# Patient Record
Sex: FEMALE | Race: WHITE | NOT HISPANIC OR LATINO | Employment: FULL TIME | URBAN - METROPOLITAN AREA
[De-identification: names, ages, dates, MRNs, and addresses within clinical notes are randomized per-mention and may not be internally consistent; named-entity substitution may affect disease eponyms.]

---

## 2017-01-12 ENCOUNTER — OFFICE VISIT (OUTPATIENT)
Dept: INTERNAL MEDICINE | Facility: CLINIC | Age: 52
End: 2017-01-12

## 2017-01-12 ENCOUNTER — HOSPITAL ENCOUNTER (OUTPATIENT)
Dept: GENERAL RADIOLOGY | Facility: HOSPITAL | Age: 52
Discharge: HOME | End: 2017-01-12
Attending: INTERNAL MEDICINE | Admitting: INTERNAL MEDICINE

## 2017-01-12 VITALS
DIASTOLIC BLOOD PRESSURE: 74 MMHG | WEIGHT: 157.4 LBS | HEIGHT: 67 IN | SYSTOLIC BLOOD PRESSURE: 110 MMHG | OXYGEN SATURATION: 98 % | HEART RATE: 61 BPM | BODY MASS INDEX: 24.71 KG/M2

## 2017-01-12 DIAGNOSIS — J06.9 RECURRENT UPPER RESPIRATORY INFECTION (URI): Primary | ICD-10-CM

## 2017-01-12 LAB
BASOPHILS # BLD AUTO: 0.02 10*3/MM3 (ref 0–0.2)
BASOPHILS NFR BLD AUTO: 0.2 % (ref 0–1)
CRP SERPL-MCNC: <0.12 MG/DL (ref 0–10)
DEPRECATED RDW RBC AUTO: 41.1 FL (ref 37–54)
EOSINOPHIL # BLD AUTO: 0.1 10*3/MM3 (ref 0.1–0.3)
EOSINOPHIL NFR BLD AUTO: 1.2 % (ref 0–3)
ERYTHROCYTE [DISTWIDTH] IN BLOOD BY AUTOMATED COUNT: 12.3 % (ref 11.3–14.5)
ERYTHROCYTE [SEDIMENTATION RATE] IN BLOOD: 5 MM/HR (ref 0–20)
FERRITIN SERPL-MCNC: 18 NG/ML (ref 10–291)
HCT VFR BLD AUTO: 38.9 % (ref 34.5–44)
HGB BLD-MCNC: 12.8 G/DL (ref 11.5–15.5)
IMM GRANULOCYTES # BLD: 0.01 10*3/MM3 (ref 0–0.03)
IMM GRANULOCYTES NFR BLD: 0.1 % (ref 0–0.6)
LYMPHOCYTES # BLD AUTO: 2.21 10*3/MM3 (ref 0.6–4.8)
LYMPHOCYTES NFR BLD AUTO: 27.2 % (ref 24–44)
MCH RBC QN AUTO: 29.9 PG (ref 27–31)
MCHC RBC AUTO-ENTMCNC: 32.9 G/DL (ref 32–36)
MCV RBC AUTO: 90.9 FL (ref 80–99)
MONOCYTES # BLD AUTO: 0.71 10*3/MM3 (ref 0–1)
MONOCYTES NFR BLD AUTO: 8.7 % (ref 0–12)
NEUTROPHILS # BLD AUTO: 5.08 10*3/MM3 (ref 1.5–8.3)
NEUTROPHILS NFR BLD AUTO: 62.6 % (ref 41–71)
PLATELET # BLD AUTO: 254 10*3/MM3 (ref 150–450)
PMV BLD AUTO: 10.5 FL (ref 6–12)
RBC # BLD AUTO: 4.28 10*6/MM3 (ref 3.89–5.14)
WBC NRBC COR # BLD: 8.13 10*3/MM3 (ref 3.5–10.8)

## 2017-01-12 PROCEDURE — 86140 C-REACTIVE PROTEIN: CPT | Performed by: INTERNAL MEDICINE

## 2017-01-12 PROCEDURE — 85652 RBC SED RATE AUTOMATED: CPT | Performed by: INTERNAL MEDICINE

## 2017-01-12 PROCEDURE — 71020 HC CHEST PA AND LATERAL: CPT

## 2017-01-12 PROCEDURE — 99213 OFFICE O/P EST LOW 20 MIN: CPT | Performed by: INTERNAL MEDICINE

## 2017-01-12 PROCEDURE — 82728 ASSAY OF FERRITIN: CPT | Performed by: INTERNAL MEDICINE

## 2017-01-12 PROCEDURE — 85025 COMPLETE CBC W/AUTO DIFF WBC: CPT | Performed by: INTERNAL MEDICINE

## 2017-01-12 PROCEDURE — 86038 ANTINUCLEAR ANTIBODIES: CPT | Performed by: INTERNAL MEDICINE

## 2017-01-12 PROCEDURE — 82785 ASSAY OF IGE: CPT | Performed by: INTERNAL MEDICINE

## 2017-01-12 NOTE — PROGRESS NOTES
"URI and Cough    Subjective   Delma Mccullough is a 51 y.o. female is here today for follow-up.    History of Present Illness   Delma reports that she has bene taking Vitamin D , Vitamin C, and other supplements , and continues to have recurrent infectionbs.  She is concerned about some internal issues whichmay be causing it.  Her sxs from last mointh never completely , continues to cough, SUnday started having tickle in her throat, drainage and cough and chest hurting.    Current Outpatient Prescriptions:   •  benzonatate (TESSALON) 200 MG capsule, Take 1 capsule by mouth 3 (Three) Times a Day As Needed for cough., Disp: 30 capsule, Rfl: 0  •  ibuprofen (ADVIL,MOTRIN) 200 MG tablet, Take 600 mg by mouth As Needed for mild pain (1-3)., Disp: , Rfl:   •  promethazine-codeine (PHENERGAN with CODEINE) 6.25-10 MG/5ML syrup, 1-2 tsp at bedtime as needed for cough, Disp: 120 mL, Rfl: 0      The following portions of the patient's history were reviewed and updated as appropriate: allergies, current medications, past family history, past medical history, past social history, past surgical history and problem list.    Review of Systems   Constitutional: Negative.  Negative for chills and fever.   HENT: Positive for congestion and postnasal drip. Negative for ear discharge, ear pain, sinus pressure and sore throat.    Respiratory: Positive for cough. Negative for chest tightness and shortness of breath.    Cardiovascular: Negative for chest pain, palpitations and leg swelling.   Gastrointestinal: Negative for diarrhea, nausea and vomiting.   Musculoskeletal: Negative for arthralgias, back pain and myalgias.   Neurological: Negative for dizziness, syncope and headaches.   Psychiatric/Behavioral: Negative for confusion and sleep disturbance.       Objective   Visit Vitals   • /74   • Pulse 61   • Ht 67\" (170.2 cm)   • Wt 157 lb 6.4 oz (71.4 kg)   • SpO2 98%  Comment: ra   • BMI 24.65 kg/m2     Physical Exam   Constitutional: " She is oriented to person, place, and time. She appears well-developed and well-nourished.   HENT:   Head: Normocephalic and atraumatic.   Right Ear: Tympanic membrane is bulging.   Left Ear: Tympanic membrane is bulging.   Mouth/Throat: Posterior oropharyngeal erythema present. No oropharyngeal exudate or tonsillar abscesses.   Eyes: Pupils are equal, round, and reactive to light.   Neck: Normal range of motion.   Pulmonary/Chest: Effort normal and breath sounds normal. No stridor.   Abdominal: Soft. Bowel sounds are normal.   Lymphadenopathy:     She has cervical adenopathy.   Neurological: She is alert and oriented to person, place, and time.   Skin: Skin is warm and dry.   Psychiatric: She has a normal mood and affect.         Results for orders placed or performed in visit on 01/12/17   Ferritin   Result Value Ref Range    Ferritin 18.00 10.00 - 291.00 ng/mL   Sedimentation Rate   Result Value Ref Range    Sed Rate 5 0 - 20 mm/hr   C-reactive Protein   Result Value Ref Range    C-Reactive Protein <0.12 0.00 - 10.00 mg/dL   CBC Auto Differential   Result Value Ref Range    WBC 8.13 3.50 - 10.80 10*3/mm3    RBC 4.28 3.89 - 5.14 10*6/mm3    Hemoglobin 12.8 11.5 - 15.5 g/dL    Hematocrit 38.9 34.5 - 44.0 %    MCV 90.9 80.0 - 99.0 fL    MCH 29.9 27.0 - 31.0 pg    MCHC 32.9 32.0 - 36.0 g/dL    RDW 12.3 11.3 - 14.5 %    RDW-SD 41.1 37.0 - 54.0 fl    MPV 10.5 6.0 - 12.0 fL    Platelets 254 150 - 450 10*3/mm3    Neutrophil % 62.6 41.0 - 71.0 %    Lymphocyte % 27.2 24.0 - 44.0 %    Monocyte % 8.7 0.0 - 12.0 %    Eosinophil % 1.2 0.0 - 3.0 %    Basophil % 0.2 0.0 - 1.0 %    Immature Grans % 0.1 0.0 - 0.6 %    Neutrophils, Absolute 5.08 1.50 - 8.30 10*3/mm3    Lymphocytes, Absolute 2.21 0.60 - 4.80 10*3/mm3    Monocytes, Absolute 0.71 0.00 - 1.00 10*3/mm3    Eosinophils, Absolute 0.10 0.10 - 0.30 10*3/mm3    Basophils, Absolute 0.02 0.00 - 0.20 10*3/mm3    Immature Grans, Absolute 0.01 0.00 - 0.03 10*3/mm3              Assessment/Plan   Diagnoses and all orders for this visit:    Recurrent upper respiratory infection (URI)  -     CBC & Differential  -     Ferritin  -     PATRICK With / DsDNA, RNP, Sjogrens A / B, Smith  -     Sedimentation Rate  -     C-reactive Protein  -     XR Chest PA & Lateral  -     Immunoglobulins, IgG, IgA, IgM  -     IgE  -     CBC Auto Differential  Continue tessalon perles and cough syrup, adv. To add saline rinse and mucinex dm.  Check labs as above and if all normal, refer to allergy.  Self referral sheet given.             Return for Next scheduled follow up.

## 2017-01-12 NOTE — MR AVS SNAPSHOT
Delma Mccullough   1/12/2017 11:30 AM   Office Visit    Dept Phone:  [phone]   Encounter #:  [id number]    Provider:  Joana Hunter MD   Department:  [facility] INTERNAL MEDICINE AND ENDOCRINOLOGY [place]                Your Full Care Plan              Today's Medication Changes          These changes are accurate as of: 1/12/17  2:36 PM.  If you have any questions, ask your nurse or doctor.               Stop taking medication(s)listed here:     azithromycin 250 MG tablet   Commonly known as:  ZITHROMAX                      Your Updated Medication List          This list is accurate as of: 1/12/17  2:36 PM.  Always use your most recent med list.                benzonatate 200 MG capsule   Commonly known as:  TESSALON   Take 1 capsule by mouth 3 (Three) Times a Day As Needed for cough.       ibuprofen 200 MG tablet   Commonly known as:  ADVIL,MOTRIN       promethazine-codeine 6.25-10 MG/5ML syrup   Commonly known as:  PHENERGAN with CODEINE   1-2 tsp at bedtime as needed for cough               We Performed the Following     PATRICK With / DsDNA, RNP, Sjogrens A / B, Smith     C-reactive Protein     CBC & Differential     CBC Auto Differential     Ferritin     IgE     Immunoglobulins, IgG, IgA, IgM     Sedimentation Rate     XR Chest PA & Lateral       You Were Diagnosed With        Codes Comments    Recurrent upper respiratory infection (URI)    -  Primary ICD-10-CM: J06.9  ICD-9-CM: 465.9       Instructions     None    Patient Instructions History      Upcoming Appointments     Visit Type Date Time Department    FOLLOW UP 1/12/2017 11:30 AM [facility]    XR [facility] CHEST PA AND LATERAL 1/12/2017  2:15 PM [facility]    PAP SMEAR/PELVIC EXAM 2/6/2017  1:30 PM [facility]    FOLLOW UP 6/19/2017  2:15 PM [facility]      MyChart Signup     Our records indicate that you have an active [facility] [employer] account.    You can view your After Visit Summary by going to  "[email or web address] and logging in with your [employer] username and password.  If you don't have a [employer] username and password but a parent or guardian has access to your record, the parent or guardian should login with their own [employer] username and password and access your record to view the After Visit Summary.    If you have questions, you can email [email or web address] or call [phone] to talk to our [employer] staff.  Remember, [employer] is NOT to be used for urgent needs.  For medical emergencies, dial 911.               Other Info from Your Visit           Your Appointments     Feb 06, 2017  1:30 PM EST   Pap Smear/Pelvic Exam with Josef Campos MD   [facility] (--)    [address]  [facility] [address]   [phone]            Jun 19, 2017  2:15 PM EDT   Follow Up with Joana Hunter MD   [facility] [place] INTERNAL MEDICINE AND ENDOCRINOLOGY [place] (--)    [address]  [facility] [address]   [phone]           Arrive 15 minutes prior to appointment.              Allergies     Other      Tramadol  Mental Status Change      Reason for Visit     URI     Cough           Vital Signs     Blood Pressure Pulse Height Weight Oxygen Saturation Body Mass Index    110/74 61 67\" (170.2 cm) 157 lb 6.4 oz (71.4 kg) 98% 24.65 kg/m2    Smoking Status                   Former Smoker           Problems and Diagnoses Noted     Recurrent upper respiratory infection (URI)    -  Primary      Results         "

## 2017-01-13 LAB
ANA SER QL: POSITIVE
CENTROMERE B AB SER-ACNC: <0.2 AI (ref 0–0.9)
CHROMATIN AB SERPL-ACNC: <0.2 AI (ref 0–0.9)
DSDNA AB SER-ACNC: 1 IU/ML (ref 0–9)
ENA JO1 AB SER-ACNC: <0.2 AI (ref 0–0.9)
ENA RNP AB SER-ACNC: <0.2 AI (ref 0–0.9)
ENA SCL70 AB SER-ACNC: 5.2 AI (ref 0–0.9)
ENA SM AB SER-ACNC: <0.2 AI (ref 0–0.9)
ENA SS-A AB SER-ACNC: <0.2 AI (ref 0–0.9)
ENA SS-B AB SER-ACNC: <0.2 AI (ref 0–0.9)
IGA SERPL-MCNC: 282 MG/DL (ref 87–352)
IGG SERPL-MCNC: 1033 MG/DL (ref 700–1600)
IGM SERPL-MCNC: 136 MG/DL (ref 26–217)
Lab: ABNORMAL
TOTAL IGE SMQN RAST: 5 IU/ML (ref 0–100)

## 2017-02-01 ENCOUNTER — OFFICE VISIT (OUTPATIENT)
Dept: INTERNAL MEDICINE | Facility: CLINIC | Age: 52
End: 2017-02-01

## 2017-02-01 VITALS
HEART RATE: 75 BPM | OXYGEN SATURATION: 99 % | HEIGHT: 67 IN | BODY MASS INDEX: 25.15 KG/M2 | DIASTOLIC BLOOD PRESSURE: 80 MMHG | SYSTOLIC BLOOD PRESSURE: 110 MMHG | WEIGHT: 160.2 LBS

## 2017-02-01 DIAGNOSIS — F41.8 TEST ANXIETY: ICD-10-CM

## 2017-02-01 DIAGNOSIS — B02.29 POST HERPETIC NEURALGIA: ICD-10-CM

## 2017-02-01 DIAGNOSIS — R76.8 POSITIVE ANA (ANTINUCLEAR ANTIBODY): Primary | ICD-10-CM

## 2017-02-01 DIAGNOSIS — R53.83 OTHER FATIGUE: ICD-10-CM

## 2017-02-01 PROCEDURE — 99213 OFFICE O/P EST LOW 20 MIN: CPT | Performed by: INTERNAL MEDICINE

## 2017-02-01 RX ORDER — PROPRANOLOL HYDROCHLORIDE 10 MG/1
10 TABLET ORAL DAILY PRN
Qty: 15 TABLET | Refills: 0 | Status: SHIPPED | OUTPATIENT
Start: 2017-02-01 | End: 2017-05-04

## 2017-02-01 RX ORDER — LIDOCAINE 50 MG/G
1 PATCH TOPICAL EVERY 24 HOURS
Qty: 30 PATCH | Refills: 1 | Status: SHIPPED | OUTPATIENT
Start: 2017-02-01 | End: 2017-05-04

## 2017-02-01 NOTE — PROGRESS NOTES
"Lab results    Subjective   Delma Mccullough is a 51 y.o. female is here today for follow-up.    History of Present Illness   Delma is here for a follow up on her abnormal autoimmune labs.  Concerned that she still has twinges of pain in her left flank. Not taking the Neurontin.      Current Outpatient Prescriptions:   •  ibuprofen (ADVIL,MOTRIN) 200 MG tablet, Take 600 mg by mouth As Needed for mild pain (1-3)., Disp: , Rfl:   •  lidocaine (LIDODERM) 5 %, Place 1 patch on the skin Daily. Remove & Discard patch within 12 hours or as directed by MD, Disp: 30 patch, Rfl: 1  •  propranolol (INDERAL) 10 MG tablet, Take 1 tablet by mouth Daily As Needed (anxiety)., Disp: 15 tablet, Rfl: 0      The following portions of the patient's history were reviewed and updated as appropriate: allergies, current medications, past family history, past medical history, past social history, past surgical history and problem list.    Review of Systems   Constitutional: Positive for fatigue. Negative for chills and fever.   HENT: Negative for ear discharge, ear pain, sinus pressure and sore throat.    Respiratory: Negative for cough, chest tightness and shortness of breath.    Cardiovascular: Negative for chest pain, palpitations and leg swelling.   Gastrointestinal: Negative for diarrhea, nausea and vomiting.   Genitourinary: Positive for flank pain.   Musculoskeletal: Negative for arthralgias, back pain and myalgias.   Neurological: Negative for dizziness, syncope and headaches.   Psychiatric/Behavioral: Negative for confusion and sleep disturbance.       Objective   Visit Vitals   • /80   • Pulse 75   • Ht 67\" (170.2 cm)   • Wt 160 lb 3.2 oz (72.7 kg)   • SpO2 99%  Comment: ra   • BMI 25.09 kg/m2     Physical Exam   Constitutional: She is oriented to person, place, and time. She appears well-developed and well-nourished.   HENT:   Head: Normocephalic and atraumatic.   Eyes: Conjunctivae are normal. Pupils are equal, round, and " reactive to light.   Neck: Neck supple. No thyromegaly present.   Cardiovascular: Normal rate and regular rhythm.    Pulmonary/Chest: Effort normal and breath sounds normal.   Abdominal: Soft. Bowel sounds are normal. She exhibits no distension. There is no tenderness.   Musculoskeletal: She exhibits no edema.   Neurological: She is alert and oriented to person, place, and time. No cranial nerve deficit.   Skin: Skin is warm and dry. No rash noted.   Psychiatric: She has a normal mood and affect. Judgment normal.   Nursing note and vitals reviewed.        Results for orders placed or performed in visit on 01/12/17   Ferritin   Result Value Ref Range    Ferritin 18.00 10.00 - 291.00 ng/mL   PATRICK With / DsDNA, RNP, Sjogrens A / B, Villasenor   Result Value Ref Range    PATRICK Direct Positive (A) Negative    Anti-DNA (DS) Ab Qn 1 0 - 9 IU/mL    RNP Antibodies <0.2 0.0 - 0.9 AI    Villasenor Antibodies <0.2 0.0 - 0.9 AI    Antiscleroderma-70 Antibodies 5.2 (H) 0.0 - 0.9 AI    VANESSA SSA (RO) Ab <0.2 0.0 - 0.9 AI    VANESSA SSB (LA) Ab <0.2 0.0 - 0.9 AI    Antichromatin Antibodies <0.2 0.0 - 0.9 AI    SIMONE-1 IgG <0.2 0.0 - 0.9 AI    Anti-Centromere B Antibodies <0.2 0.0 - 0.9 AI    See below: Comment    Sedimentation Rate   Result Value Ref Range    Sed Rate 5 0 - 20 mm/hr   C-reactive Protein   Result Value Ref Range    C-Reactive Protein <0.12 0.00 - 10.00 mg/dL   Immunoglobulins, IgG, IgA, IgM   Result Value Ref Range    IgG 1033 700 - 1600 mg/dL    IgA 282 87 - 352 mg/dL    IgM 136 26 - 217 mg/dL   IgE   Result Value Ref Range    IgE 5 0 - 100 IU/mL   CBC Auto Differential   Result Value Ref Range    WBC 8.13 3.50 - 10.80 10*3/mm3    RBC 4.28 3.89 - 5.14 10*6/mm3    Hemoglobin 12.8 11.5 - 15.5 g/dL    Hematocrit 38.9 34.5 - 44.0 %    MCV 90.9 80.0 - 99.0 fL    MCH 29.9 27.0 - 31.0 pg    MCHC 32.9 32.0 - 36.0 g/dL    RDW 12.3 11.3 - 14.5 %    RDW-SD 41.1 37.0 - 54.0 fl    MPV 10.5 6.0 - 12.0 fL    Platelets 254 150 - 450 10*3/mm3     Neutrophil % 62.6 41.0 - 71.0 %    Lymphocyte % 27.2 24.0 - 44.0 %    Monocyte % 8.7 0.0 - 12.0 %    Eosinophil % 1.2 0.0 - 3.0 %    Basophil % 0.2 0.0 - 1.0 %    Immature Grans % 0.1 0.0 - 0.6 %    Neutrophils, Absolute 5.08 1.50 - 8.30 10*3/mm3    Lymphocytes, Absolute 2.21 0.60 - 4.80 10*3/mm3    Monocytes, Absolute 0.71 0.00 - 1.00 10*3/mm3    Eosinophils, Absolute 0.10 0.10 - 0.30 10*3/mm3    Basophils, Absolute 0.02 0.00 - 0.20 10*3/mm3    Immature Grans, Absolute 0.01 0.00 - 0.03 10*3/mm3             Assessment/Plan   Diagnoses and all orders for this visit:    Positive PATRICK (antinuclear antibody)  -     lidocaine (LIDODERM) 5 %; Place 1 patch on the skin Daily. Remove & Discard patch within 12 hours or as directed by MD  -     Ambulatory Referral to Rheumatology    Post herpetic neuralgia    Other fatigue  -     Ambulatory Referral to Rheumatology    Test anxiety  -     propranolol (INDERAL) 10 MG tablet; Take 1 tablet by mouth Daily As Needed (anxiety).               Return for Next scheduled follow up.

## 2017-02-06 ENCOUNTER — PROCEDURE VISIT (OUTPATIENT)
Dept: OBSTETRICS AND GYNECOLOGY | Facility: CLINIC | Age: 52
End: 2017-02-06

## 2017-02-06 VITALS
SYSTOLIC BLOOD PRESSURE: 100 MMHG | BODY MASS INDEX: 24.96 KG/M2 | WEIGHT: 159 LBS | DIASTOLIC BLOOD PRESSURE: 78 MMHG | HEIGHT: 67 IN

## 2017-02-06 DIAGNOSIS — Z00.00 ANNUAL PHYSICAL EXAM: Primary | ICD-10-CM

## 2017-02-06 PROCEDURE — 99396 PREV VISIT EST AGE 40-64: CPT | Performed by: OBSTETRICS & GYNECOLOGY

## 2017-02-06 NOTE — PROGRESS NOTES
"Subjective     Chief Complaint   Patient presents with   • Gynecologic Exam     annual pap       Delma Mccullough is a 51 y.o. year old  presenting to be seen for her annual exam.      Her LMP was Patient's last menstrual period was 2017 (exact date)..  Her cycles are regular, predictable and consistent every 28 - 32 days.  Usually her flow is normal with no more than 1 days of heavy flow each menses.   Additionally she describes pelvic pain (absent) associated with her menses.    She is sexually active.  In the past 12 months there have not been new sexual partners.  Condoms are not typically used.  She would not like to be screened for STD's at today's exam.     Current contraceptive methods being used: she is not heterosexually active.  She exercises regularly: yes.  She wears her seat belt: yes.  She has concerns about domestic violence: no.    GYN screening history:  · Last pap: she reports her last PAP was normal.    No Additional Complaints Reported    The following portions of the patient's history were reviewed and updated as appropriate:vital signs, allergies, current medications, past medical history, past social history, past surgical history and problem list.    Review of Systems  Constitutional: positive for fatigue  Respiratory: negative  Cardiovascular: negative  Gastrointestinal: negative  Genitourinary:negative  Integument/breast: negative     Physical Exam    Objective     Visit Vitals   • /78   • Ht 67\" (170.2 cm)   • Wt 159 lb (72.1 kg)   • LMP 2017 (Exact Date)   • Breastfeeding No   • BMI 24.9 kg/m2       General:  well developed; well nourished  no acute distress   Skin:  No suspicious lesions seen   Thyroid: normal to inspection and palpation   Lungs:  breathing is unlabored  clear to auscultation bilaterally   Heart:  regular rate and rhythm, S1, S2 normal, no murmur, click, rub or gallop   Breasts:  Examined in supine position  Symmetric without masses or skin " dimpling  Nipples normal without inversion, lesions or discharge  There are no palpable axillary nodes   Abdomen: soft, non-tender; no masses  no umbilical or inginual hernias are present  no hepato-splenomegaly   Pelvis: Clinical staff was present for exam  External genitalia:  normal appearance of the external genitalia including Bartholin's and Astor's glands.  :  urethral meatus normal; urethral hypermobility is absent.  Vaginal:  normal pink mucosa without prolapse or lesions.  Cervix:  normal appearance.  Uterus:  normal size, shape and consistency.  Adnexa:  normal bimanual exam of the adnexa.                   Lab Review   No data reviewed    Imaging  No data reviewed    Assessment/Plan     ASSESSMENT  1. Annual physical exam        PLAN  No orders of the defined types were placed in this encounter.    No orders of the defined types were placed in this encounter.        Follow up: 1 year(s)         This note was electronically signed.    Josef Campos MD  February 6, 2017

## 2017-05-02 ENCOUNTER — TELEPHONE (OUTPATIENT)
Dept: INTERNAL MEDICINE | Facility: CLINIC | Age: 52
End: 2017-05-02

## 2017-05-04 ENCOUNTER — OFFICE VISIT (OUTPATIENT)
Dept: INTERNAL MEDICINE | Facility: CLINIC | Age: 52
End: 2017-05-04

## 2017-05-04 VITALS
BODY MASS INDEX: 25.11 KG/M2 | HEIGHT: 67 IN | HEART RATE: 60 BPM | OXYGEN SATURATION: 98 % | WEIGHT: 160 LBS | SYSTOLIC BLOOD PRESSURE: 116 MMHG | DIASTOLIC BLOOD PRESSURE: 74 MMHG

## 2017-05-04 DIAGNOSIS — G45.3 AMAUROSIS FUGAX OF LEFT EYE: Primary | ICD-10-CM

## 2017-05-04 DIAGNOSIS — R93.89 ABNORMAL MRI: ICD-10-CM

## 2017-05-04 DIAGNOSIS — H53.9 VISION ABNORMALITIES: ICD-10-CM

## 2017-05-04 PROCEDURE — 99213 OFFICE O/P EST LOW 20 MIN: CPT | Performed by: INTERNAL MEDICINE

## 2017-05-22 ENCOUNTER — HOSPITAL ENCOUNTER (OUTPATIENT)
Dept: CARDIOLOGY | Facility: HOSPITAL | Age: 52
Discharge: HOME | End: 2017-05-22
Attending: INTERNAL MEDICINE | Admitting: INTERNAL MEDICINE

## 2017-05-22 ENCOUNTER — HOSPITAL ENCOUNTER (OUTPATIENT)
Dept: MRI IMAGING | Facility: HOSPITAL | Age: 52
Discharge: HOME | End: 2017-05-22
Attending: INTERNAL MEDICINE

## 2017-05-22 PROCEDURE — 0 GADOBENATE DIMEGLUMINE 529 MG/ML SOLUTION: Performed by: INTERNAL MEDICINE

## 2017-05-22 PROCEDURE — 93880 EXTRACRANIAL BILAT STUDY: CPT | Performed by: INTERNAL MEDICINE

## 2017-05-22 PROCEDURE — 93880 EXTRACRANIAL BILAT STUDY: CPT

## 2017-05-22 PROCEDURE — 70553 MRI BRAIN STEM W/O & W/DYE: CPT

## 2017-05-22 PROCEDURE — A9577 INJ MULTIHANCE: HCPCS | Performed by: INTERNAL MEDICINE

## 2017-05-22 RX ADMIN — GADOBENATE DIMEGLUMINE 14 ML: 529 INJECTION, SOLUTION INTRAVENOUS at 17:45

## 2017-05-23 LAB
BH CV XLRA MEAS LEFT CCA RATIO VEL: 120 CM/SEC
BH CV XLRA MEAS LEFT DIST CCA EDV: 34.1 CM/SEC
BH CV XLRA MEAS LEFT DIST CCA PSV: 120.5 CM/SEC
BH CV XLRA MEAS LEFT DIST ICA EDV: 47.1 CM/SEC
BH CV XLRA MEAS LEFT DIST ICA PSV: 110.6 CM/SEC
BH CV XLRA MEAS LEFT ICA RATIO VEL: 96.2 CM/SEC
BH CV XLRA MEAS LEFT ICA/CCA RATIO: 0.8
BH CV XLRA MEAS LEFT MID ICA EDV: 40.2 CM/SEC
BH CV XLRA MEAS LEFT MID ICA PSV: 96.8 CM/SEC
BH CV XLRA MEAS LEFT PROX CCA EDV: 34.1 CM/SEC
BH CV XLRA MEAS LEFT PROX CCA PSV: 142.3 CM/SEC
BH CV XLRA MEAS LEFT PROX ECA PSV: 105.6 CM/SEC
BH CV XLRA MEAS LEFT PROX ICA EDV: 32.7 CM/SEC
BH CV XLRA MEAS LEFT PROX ICA PSV: 72.9 CM/SEC
BH CV XLRA MEAS LEFT PROX SCLA PSV: 164.1 CM/SEC
BH CV XLRA MEAS LEFT VERTEBRAL A EDV: 22.6 CM/SEC
BH CV XLRA MEAS LEFT VERTEBRAL A PSV: 62.9 CM/SEC
BH CV XLRA MEAS RIGHT CCA RATIO VEL: 107 CM/SEC
BH CV XLRA MEAS RIGHT DIST CCA EDV: 33.8 CM/SEC
BH CV XLRA MEAS RIGHT DIST CCA PSV: 107.6 CM/SEC
BH CV XLRA MEAS RIGHT DIST ICA EDV: 55.3 CM/SEC
BH CV XLRA MEAS RIGHT DIST ICA PSV: 121.9 CM/SEC
BH CV XLRA MEAS RIGHT ICA RATIO VEL: 107 CM/SEC
BH CV XLRA MEAS RIGHT ICA/CCA RATIO: 1
BH CV XLRA MEAS RIGHT MID ICA EDV: 45.3 CM/SEC
BH CV XLRA MEAS RIGHT MID ICA PSV: 107.5 CM/SEC
BH CV XLRA MEAS RIGHT PROX CCA EDV: 27.5 CM/SEC
BH CV XLRA MEAS RIGHT PROX CCA PSV: 106.9 CM/SEC
BH CV XLRA MEAS RIGHT PROX ECA PSV: 128.9 CM/SEC
BH CV XLRA MEAS RIGHT PROX ICA EDV: 30.2 CM/SEC
BH CV XLRA MEAS RIGHT PROX ICA PSV: 98.1 CM/SEC
BH CV XLRA MEAS RIGHT PROX SCLA PSV: 144.6 CM/SEC
BH CV XLRA MEAS RIGHT VERTEBRAL A EDV: 15.7 CM/SEC
BH CV XLRA MEAS RIGHT VERTEBRAL A PSV: 52.4 CM/SEC

## 2017-05-24 ENCOUNTER — TELEPHONE (OUTPATIENT)
Dept: INTERNAL MEDICINE | Facility: CLINIC | Age: 52
End: 2017-05-24

## 2017-05-24 ENCOUNTER — OFFICE VISIT (OUTPATIENT)
Dept: NEUROLOGY | Facility: CLINIC | Age: 52
End: 2017-05-24

## 2017-05-24 VITALS
BODY MASS INDEX: 25.33 KG/M2 | OXYGEN SATURATION: 99 % | WEIGHT: 161.4 LBS | SYSTOLIC BLOOD PRESSURE: 114 MMHG | DIASTOLIC BLOOD PRESSURE: 68 MMHG | HEIGHT: 67 IN | HEART RATE: 61 BPM

## 2017-05-24 DIAGNOSIS — G43.109 RETINAL MIGRAINE: Primary | ICD-10-CM

## 2017-05-24 PROCEDURE — 99244 OFF/OP CNSLTJ NEW/EST MOD 40: CPT | Performed by: PSYCHIATRY & NEUROLOGY

## 2017-05-24 RX ORDER — ELECTROLYTES/DEXTROSE
1 SOLUTION, ORAL ORAL DAILY
COMMUNITY

## 2017-11-02 ENCOUNTER — TRANSCRIBE ORDERS (OUTPATIENT)
Dept: ADMINISTRATIVE | Facility: HOSPITAL | Age: 52
End: 2017-11-02

## 2017-11-02 DIAGNOSIS — G37.9 DEMYELINATING DISEASE (HCC): Primary | ICD-10-CM

## 2017-11-02 DIAGNOSIS — H53.9 VISUAL DISTURBANCES: ICD-10-CM

## 2017-11-13 ENCOUNTER — HOSPITAL ENCOUNTER (OUTPATIENT)
Dept: MRI IMAGING | Facility: HOSPITAL | Age: 52
Discharge: HOME | End: 2017-11-13
Admitting: PSYCHIATRY & NEUROLOGY

## 2017-11-13 DIAGNOSIS — H53.9 VISUAL DISTURBANCES: ICD-10-CM

## 2017-11-13 DIAGNOSIS — G37.9 DEMYELINATING DISEASE (HCC): ICD-10-CM

## 2017-11-13 PROCEDURE — 70551 MRI BRAIN STEM W/O DYE: CPT

## 2017-12-26 ENCOUNTER — TRANSCRIBE ORDERS (OUTPATIENT)
Dept: ADMINISTRATIVE | Facility: HOSPITAL | Age: 52
End: 2017-12-26

## 2017-12-26 DIAGNOSIS — Z12.31 VISIT FOR SCREENING MAMMOGRAM: Primary | ICD-10-CM

## 2018-01-03 ENCOUNTER — OFFICE VISIT (OUTPATIENT)
Dept: INTERNAL MEDICINE | Facility: CLINIC | Age: 53
End: 2018-01-03

## 2018-01-03 VITALS
BODY MASS INDEX: 25.27 KG/M2 | TEMPERATURE: 98.9 F | HEART RATE: 67 BPM | HEIGHT: 67 IN | SYSTOLIC BLOOD PRESSURE: 110 MMHG | OXYGEN SATURATION: 99 % | WEIGHT: 161 LBS | DIASTOLIC BLOOD PRESSURE: 62 MMHG

## 2018-01-03 DIAGNOSIS — R05.9 COUGHING: ICD-10-CM

## 2018-01-03 DIAGNOSIS — J02.9 SORE THROAT: ICD-10-CM

## 2018-01-03 DIAGNOSIS — J11.1 INFLUENZA: Primary | ICD-10-CM

## 2018-01-03 LAB
EXPIRATION DATE: NORMAL
FLUAV AG NPH QL: NEGATIVE
FLUBV AG NPH QL: NEGATIVE
INTERNAL CONTROL: NORMAL
Lab: NORMAL

## 2018-01-03 PROCEDURE — 87804 INFLUENZA ASSAY W/OPTIC: CPT | Performed by: NURSE PRACTITIONER

## 2018-01-03 PROCEDURE — 99213 OFFICE O/P EST LOW 20 MIN: CPT | Performed by: NURSE PRACTITIONER

## 2018-01-03 RX ORDER — DEXTROMETHORPHAN HYDROBROMIDE AND PROMETHAZINE HYDROCHLORIDE 15; 6.25 MG/5ML; MG/5ML
5 SYRUP ORAL 4 TIMES DAILY PRN
Qty: 118 ML | Refills: 1 | Status: SHIPPED | OUTPATIENT
Start: 2018-01-03 | End: 2018-01-12 | Stop reason: SDUPTHER

## 2018-01-03 RX ORDER — OSELTAMIVIR PHOSPHATE 75 MG/1
75 CAPSULE ORAL 2 TIMES DAILY
Qty: 10 CAPSULE | Refills: 0 | Status: SHIPPED | OUTPATIENT
Start: 2018-01-03 | End: 2018-01-08

## 2018-01-03 RX ORDER — IBUPROFEN 600 MG/1
600 TABLET ORAL EVERY 6 HOURS PRN
Qty: 60 TABLET | Refills: 0 | Status: SHIPPED | OUTPATIENT
Start: 2018-01-03 | End: 2018-03-01

## 2018-01-03 NOTE — PROGRESS NOTES
Chief Complaint   Patient presents with   • Chest Pain   • Cough   • Generalized Body Aches   • Sore Throat       HPI  Delma Mccullough is a 52 y.o. female who presents with cough that started yesterday afternoon. Around midnight woke up and and cough was much worse, hurts to take deep breath. Has sore throat, chills, nausea in waves.   Has taken a couple tesselon pearles which haven't helped & had nyquil around 1:30 am    [facility]  The following portions of the patient's history were reviewed and updated as appropriate: allergies, current medications, past family history, past medical history, past social history, past surgical history and problem list.    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       No current facility-administered medications on file prior to visit.          Review of Systems   Constitutional: Positive for appetite change, chills and fever.   HENT: Positive for congestion, rhinorrhea and sore throat.    Respiratory: Positive for cough. Negative for shortness of breath and wheezing.    Cardiovascular: Negative for chest pain, palpitations and leg swelling.   Musculoskeletal: Positive for myalgias.   Neurological:  "Positive for headaches.           Objective   Blood pressure 110/62, pulse 67, temperature 98.9 °F (37.2 °C), temperature source Oral, height 170.2 cm (67\"), weight 73 kg (161 lb), SpO2 99 %, not currently breastfeeding.  Body mass index is 25.22 kg/(m^2).      Physical Exam   Constitutional: She is oriented to person, place, and time. She appears well-developed and well-nourished. She appears ill.   HENT:   Right Ear: Tympanic membrane normal.   Left Ear: Tympanic membrane normal.   Mouth/Throat: Mucous membranes are normal. Posterior oropharyngeal erythema present. No oropharyngeal exudate or posterior oropharyngeal edema.   Eyes: Conjunctivae are normal.   Neck: Normal range of motion.   Cardiovascular: Normal rate, regular rhythm and normal heart sounds.    Pulmonary/Chest: Effort normal and breath sounds normal.   Lymphadenopathy:     She has cervical adenopathy (shotty).   Neurological: She is alert and oriented to person, place, and time.   Skin: Skin is warm and dry.   Psychiatric: She has a normal mood and affect. Her behavior is normal.   Nursing note and vitals reviewed.      Results for orders placed or performed in visit on 01/03/18   POCT Influenza A/B   Result Value Ref Range    Rapid Influenza A Ag negative     Rapid Influenza B Ag negative     Internal Control Passed Passed    Lot Number [id number]     Expiration Date 03/07/2020            ASSESSMENT/PLAN  1. Influenza    - oseltamivir (TAMIFLU) 75 MG capsule; Take 1 capsule by mouth 2 (Two) Times a Day for 5 days.  Dispense: 10 capsule; Refill: 0  - ibuprofen (ADVIL,MOTRIN) 600 MG tablet; Take 1 tablet by mouth Every 6 (Six) Hours As Needed for Mild Pain .  Dispense: 60 tablet; Refill: 0    2. Sore throat    - POCT Influenza A/B    3. Coughing    - promethazine-dextromethorphan (PROMETHAZINE-DM) 6.25-15 MG/5ML syrup; Take 5 mL by mouth 4 (Four) Times a Day As Needed for Cough.  Dispense: 118 mL; Refill: 1          Plan of care reviewed with patient at " the conclusion of today's visit. Education was provided in regards to diagnosis, management and any prescribed or recommended OTC medications.  Patient verbalizes Understanding of and agreement with management plan.      FOLLOW-UP  Return if symptoms worsen or fail to improve.      Future Appointments  Date Time Provider Department Center   1/25/2018 9:20 AM [facility]   2/8/2018 10:00 AM Josef Campos MD [facility] None         Electronically signed by:  MARGARITA Nava  01/03/2018

## 2018-01-12 ENCOUNTER — OFFICE VISIT (OUTPATIENT)
Dept: INTERNAL MEDICINE | Facility: CLINIC | Age: 53
End: 2018-01-12

## 2018-01-12 VITALS
WEIGHT: 158.6 LBS | DIASTOLIC BLOOD PRESSURE: 66 MMHG | RESPIRATION RATE: 20 BRPM | SYSTOLIC BLOOD PRESSURE: 106 MMHG | BODY MASS INDEX: 24.89 KG/M2 | OXYGEN SATURATION: 99 % | HEART RATE: 71 BPM | HEIGHT: 67 IN

## 2018-01-12 DIAGNOSIS — J01.10 ACUTE FRONTAL SINUSITIS, RECURRENCE NOT SPECIFIED: Primary | ICD-10-CM

## 2018-01-12 DIAGNOSIS — R05.9 COUGHING: ICD-10-CM

## 2018-01-12 PROCEDURE — 99213 OFFICE O/P EST LOW 20 MIN: CPT | Performed by: INTERNAL MEDICINE

## 2018-01-12 RX ORDER — AZITHROMYCIN 250 MG/1
TABLET, FILM COATED ORAL
Qty: 6 TABLET | Refills: 0 | Status: SHIPPED | OUTPATIENT
Start: 2018-01-12 | End: 2018-03-01

## 2018-01-12 RX ORDER — DEXTROMETHORPHAN HYDROBROMIDE AND PROMETHAZINE HYDROCHLORIDE 15; 6.25 MG/5ML; MG/5ML
5 SYRUP ORAL 4 TIMES DAILY PRN
Qty: 118 ML | Refills: 1 | Status: SHIPPED | OUTPATIENT
Start: 2018-01-12 | End: 2018-03-01

## 2018-01-12 RX ORDER — METHYLPREDNISOLONE 4 MG/1
TABLET ORAL
Qty: 1 EACH | Refills: 0 | Status: SHIPPED | OUTPATIENT
Start: 2018-01-12 | End: 2018-03-01

## 2018-01-12 NOTE — PROGRESS NOTES
"URI (F/U) and Nausea (F/U)    Subjective   Delma Mccullough is a 52 y.o. female is here today for follow-up.    History of Present Illness   Started on Tamiflu last week for possible Flu.  Since Sunday, chest tightness, nausea and ear and throat pain.    Current Outpatient Prescriptions:   •  ibuprofen (ADVIL,MOTRIN) 600 MG tablet, Take 1 tablet by mouth Every 6 (Six) Hours As Needed for Mild Pain ., Disp: 60 tablet, Rfl: 0  •  Multiple Vitamins-Minerals (MULTIVITAMIN ADULT) tablet, Take 1 tablet by mouth Daily., Disp: , Rfl:   •  promethazine-dextromethorphan (PROMETHAZINE-DM) 6.25-15 MG/5ML syrup, Take 5 mL by mouth 4 (Four) Times a Day As Needed for Cough., Disp: 118 mL, Rfl: 1  •  azithromycin (ZITHROMAX) 250 MG tablet, Take 2 tablets the first day, then 1 tablet daily for 4 days., Disp: 6 tablet, Rfl: 0  •  MethylPREDNISolone (MEDROL, NARCISO,) 4 MG tablet, Take as directed on package instructions., Disp: 1 each, Rfl: 0      The following portions of the patient's history were reviewed and updated as appropriate: allergies, current medications, past family history, past medical history, past social history, past surgical history and problem list.    Review of Systems   Constitutional: Positive for chills, fatigue and fever.   HENT: Positive for congestion, ear pain, sinus pressure and sore throat. Negative for ear discharge.    Respiratory: Negative for cough, chest tightness and shortness of breath.    Cardiovascular: Negative for chest pain, palpitations and leg swelling.   Gastrointestinal: Negative for diarrhea, nausea and vomiting.   Musculoskeletal: Negative for arthralgias, back pain and myalgias.   Neurological: Negative for dizziness, syncope and headaches.   Psychiatric/Behavioral: Negative for confusion and sleep disturbance.       Objective   /66  Pulse 71  Resp 20  Ht 170.2 cm (67.01\")  Wt 71.9 kg (158 lb 9.6 oz)  SpO2 99%  BMI 24.83 kg/m2  Physical Exam   Constitutional: She is oriented to " person, place, and time. She appears well-developed and well-nourished.   HENT:   Head: Normocephalic and atraumatic.   Right Ear: Tympanic membrane is bulging.   Left Ear: Tympanic membrane is bulging.   Mouth/Throat: Posterior oropharyngeal erythema present. No oropharyngeal exudate or tonsillar abscesses.   Eyes: Pupils are equal, round, and reactive to light.   Neck: Normal range of motion.   Cardiovascular: Normal rate and regular rhythm.    Pulmonary/Chest: Effort normal and breath sounds normal. No stridor.   Abdominal: Soft. Bowel sounds are normal.   Lymphadenopathy:     She has cervical adenopathy.   Neurological: She is alert and oriented to person, place, and time.   Skin: Skin is warm and dry.   Psychiatric: She has a normal mood and affect.         Results for orders placed or performed in visit on 01/03/18   POCT Influenza A/B   Result Value Ref Range    Rapid Influenza A Ag negative     Rapid Influenza B Ag negative     Internal Control Passed Passed    Lot Number [id number]     Expiration Date 03/07/2020              Assessment/Plan   Diagnoses and all orders for this visit:    Acute frontal sinusitis, recurrence not specified  -     azithromycin (ZITHROMAX) 250 MG tablet; Take 2 tablets the first day, then 1 tablet daily for 4 days.  -     MethylPREDNISolone (MEDROL, NARCISO,) 4 MG tablet; Take as directed on package instructions.    Coughing  -     promethazine-dextromethorphan (PROMETHAZINE-DM) 6.25-15 MG/5ML syrup; Take 5 mL by mouth 4 (Four) Times a Day As Needed for Cough.           Has an inhaler from a prior visit, she will resatrt it at home.  Return if symptoms worsen or fail to improve.

## 2018-01-25 ENCOUNTER — HOSPITAL ENCOUNTER (OUTPATIENT)
Dept: MAMMOGRAPHY | Facility: HOSPITAL | Age: 53
Discharge: HOME | End: 2018-01-25
Attending: INTERNAL MEDICINE | Admitting: INTERNAL MEDICINE

## 2018-01-25 DIAGNOSIS — Z12.31 VISIT FOR SCREENING MAMMOGRAM: ICD-10-CM

## 2018-01-25 PROCEDURE — 77067 SCR MAMMO BI INCL CAD: CPT

## 2018-01-25 PROCEDURE — 77063 BREAST TOMOSYNTHESIS BI: CPT

## 2018-01-25 PROCEDURE — 77067 SCR MAMMO BI INCL CAD: CPT | Performed by: RADIOLOGY

## 2018-01-25 PROCEDURE — 77063 BREAST TOMOSYNTHESIS BI: CPT | Performed by: RADIOLOGY

## 2018-03-01 ENCOUNTER — PROCEDURE VISIT (OUTPATIENT)
Dept: OBSTETRICS AND GYNECOLOGY | Facility: CLINIC | Age: 53
End: 2018-03-01

## 2018-03-01 VITALS
BODY MASS INDEX: 24.8 KG/M2 | HEIGHT: 67 IN | WEIGHT: 158 LBS | DIASTOLIC BLOOD PRESSURE: 66 MMHG | SYSTOLIC BLOOD PRESSURE: 110 MMHG

## 2018-03-01 DIAGNOSIS — Z00.00 ANNUAL PHYSICAL EXAM: Primary | ICD-10-CM

## 2018-03-01 DIAGNOSIS — N84.1 CERVICAL POLYP: ICD-10-CM

## 2018-03-01 PROCEDURE — 57500 BIOPSY OF CERVIX: CPT | Performed by: OBSTETRICS & GYNECOLOGY

## 2018-03-01 PROCEDURE — 99396 PREV VISIT EST AGE 40-64: CPT | Performed by: OBSTETRICS & GYNECOLOGY

## 2018-03-01 NOTE — PROGRESS NOTES
"Subjective     Chief Complaint   Patient presents with   • Gynecologic Exam     annual pap       Delma Mccullough is a 52 y.o. year old  presenting to be seen for her annual exam.      Her LMP was Patient's last menstrual period was 2018 (exact date)..  Her cycles are regular, predictable and consistent every 28 - 32 days.  Usually her flow is typically normal with no more than 0 days of heavy flow each menses.   Additionally she describes pelvic pain (mild) associated with her menses.    She is sexually active.  In the past 12 months there have not been new sexual partners. .  She would not like to be screened for STD's at today's exam.     Current contraceptive methods being used: none. Same sex relationship    She exercises regularly: yes.  She wears her seat belt: yes.  She has concerns about domestic violence: not asked.    GYN screening history:  · Last pap: she reports her last PAP was normal.    No Additional Complaints Reported    The following portions of the patient's history were reviewed and updated as appropriate:vital signs, allergies, current medications, past medical history, past social history, past surgical history and problem list.    Review of Systems  Pertinent items are noted in HPI.     Physical Exam    Objective     /66  Ht 170.2 cm (67\")  Wt 71.7 kg (158 lb)  LMP 2018 (Exact Date)  Breastfeeding? No  BMI 24.75 kg/m2      General:  well developed; well nourished  no acute distress   Constitutional: healthy   Skin:  No suspicious lesions seen   Thyroid: normal to inspection and palpation   Lungs:  breathing is unlabored  clear to auscultation bilaterally   Heart:  regular rate and rhythm, S1, S2 normal, no murmur, click, rub or gallop   Breasts:  Examined in supine position  Symmetric without masses or skin dimpling  Nipples normal without inversion, lesions or discharge  There are no palpable axillary nodes   Abdomen: soft, non-tender; no masses  no umbilical or " inginual hernias are present  no hepato-splenomegaly   Pelvis: Clinical staff was present for exam  External genitalia:  normal appearance of the external genitalia including Bartholin's and Sloan's glands.  :  urethral meatus normal; urethral hypermobility is absent.  Vaginal:  normal pink mucosa without prolapse or lesions.  Cervix:  normal appearance Small endocervical polyp noted.  This polyp was removed with polyp forceps and sent to pathology for routine evaluation  Uterus:  normal size, shape and consistency  Adnexa:  normal bimanual exam of the adnexa.   Musculoskeletal: negative   Neuro: normal without focal findings, mental status, speech normal, alert and oriented x3 and ERIS   Psych: oriented to time, place and person, mood and affect are within normal limits, pt is a good historian; no memory problems were noted       Lab Review   No data reviewed    Imaging  Mammogram report    Assessment/Plan     ASSESSMENT  1. Annual physical exam    2. Cervical polyp        PLAN  No orders of the defined types were placed in this encounter.    No orders of the defined types were placed in this encounter.        Follow up: 1 year(s)         This note was electronically signed.    Josef Campos MD  March 1, 2018

## 2018-03-09 ENCOUNTER — TELEPHONE (OUTPATIENT)
Dept: OBSTETRICS AND GYNECOLOGY | Facility: CLINIC | Age: 53
End: 2018-03-09

## 2018-03-09 NOTE — TELEPHONE ENCOUNTER
----- Message from Josef Campos MD sent at 3/8/2018  5:18 PM EST -----  Please advise the patient that the cervical polyp was benign  ----- Message -----     From: Genoveva Solis     Sent: 3/8/2018   9:32 AM       To: Josef Campos MD        Patient advised of results of cervical polyp

## 2018-03-21 ENCOUNTER — OFFICE VISIT (OUTPATIENT)
Dept: INTERNAL MEDICINE | Facility: CLINIC | Age: 53
End: 2018-03-21

## 2018-03-21 VITALS
BODY MASS INDEX: 24.8 KG/M2 | WEIGHT: 158 LBS | DIASTOLIC BLOOD PRESSURE: 86 MMHG | OXYGEN SATURATION: 97 % | SYSTOLIC BLOOD PRESSURE: 130 MMHG | HEART RATE: 61 BPM | HEIGHT: 67 IN

## 2018-03-21 DIAGNOSIS — M77.8 TENDINITIS OF LEFT SHOULDER: Primary | ICD-10-CM

## 2018-03-21 PROCEDURE — 99213 OFFICE O/P EST LOW 20 MIN: CPT | Performed by: INTERNAL MEDICINE

## 2018-03-21 RX ORDER — IBUPROFEN 200 MG
200 TABLET ORAL EVERY 6 HOURS PRN
COMMUNITY
End: 2018-11-13

## 2018-03-21 RX ORDER — DICLOFENAC SODIUM 75 MG/1
75 TABLET, DELAYED RELEASE ORAL 2 TIMES DAILY
Qty: 40 TABLET | Refills: 1 | Status: SHIPPED | OUTPATIENT
Start: 2018-03-21 | End: 2018-11-13

## 2018-03-21 NOTE — PROGRESS NOTES
"Shoulder Pain (3 mo. increasing getting worse )    Subjective   Delma Mccullough is a 52 y.o. female is here today for follow-up.    History of Present Illness   Left shoulder , s/p surgery 10 years ago( Dr. Agustin). Had an MRI 2 years ago, better then so did not get PT.  Now having increased pain and numbness, especially when lays on it.  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    Current Outpatient Prescriptions:   •  ibuprofen (ADVIL,MOTRIN) 200 MG tablet, Take 200 mg by mouth Every 6 (Six) Hours As Needed for Mild Pain ., Disp: , Rfl:   •  Multiple Vitamins-Minerals (MULTIVITAMIN ADULT) tablet, Take 1 tablet by mouth Daily., Disp: , Rfl:   •  diclofenac (VOLTAREN) 75 MG EC tablet, Take 1 tablet by mouth 2 (Two) Times a Day. With food, Disp: 40 tablet, Rfl: 1      The following portions of the patient's history were reviewed and updated as appropriate: allergies, current medications, past family history, past medical history, past social history, past surgical history and problem list.    Review of Systems   Constitutional: Negative for chills, fatigue and fever.   Musculoskeletal: Positive for arthralgias (left shoulder). Negative for joint swelling and myalgias.       Objective   /86   Pulse 61   Ht 170.2 cm (67.01\")   Wt 71.7 kg (158 lb)   SpO2 97%   BMI 24.74 kg/m²   Physical Exam   Constitutional: She is oriented to person, place, and time. She appears well-developed and well-nourished.   Cardiovascular: Normal rate and regular rhythm.    Pulmonary/Chest: Effort normal and breath sounds normal.   Musculoskeletal: She exhibits tenderness (left shoulder with decreased rom.). She exhibits no deformity.   Neurological: She is alert and oriented to person, place, and time.         Results for orders placed or performed in visit on 01/03/18   POCT Influenza A/B   Result Value Ref Range    Rapid Influenza A Ag negative     Rapid Influenza B Ag negative     Internal Control Passed Passed    " Lot Number [id number]     Expiration Date 03/07/2020              Assessment/Plan   Diagnoses and all orders for this visit:    Tendinitis of left shoulder  -     Ambulatory Referral to Orthopedic Surgery  -     Ambulatory Referral to Physical Therapy Evaluate and treat    Other orders  -     ibuprofen (ADVIL,MOTRIN) 200 MG tablet; Take 200 mg by mouth Every 6 (Six) Hours As Needed for Mild Pain .  -     diclofenac (VOLTAREN) 75 MG EC tablet; Take 1 tablet by mouth 2 (Two) Times a Day. With food                 Return if symptoms worsen or fail to improve.

## 2018-03-29 ENCOUNTER — HOSPITAL ENCOUNTER (OUTPATIENT)
Dept: PHYSICAL THERAPY | Facility: HOSPITAL | Age: 53
Setting detail: THERAPIES SERIES
Discharge: HOME | End: 2018-03-29

## 2018-03-29 DIAGNOSIS — M25.612 DECREASED SHOULDER MOBILITY, LEFT: ICD-10-CM

## 2018-03-29 DIAGNOSIS — M75.42 SHOULDER IMPINGEMENT, LEFT: Primary | ICD-10-CM

## 2018-03-29 PROCEDURE — 97161 PT EVAL LOW COMPLEX 20 MIN: CPT | Performed by: PHYSICAL THERAPIST

## 2018-03-29 NOTE — THERAPY EVALUATION
Outpatient Physical Therapy Ortho Initial Evaluation  [facility]     Patient Name: Delma Mccullough  : 1965  MRN: [id number]  Today's Date: 3/29/2018      Visit Date: 2018    Patient Active Problem List   Diagnosis   • Left flank pain   • Positive PATRICK (antinuclear antibody)   • Post herpetic neuralgia   • Retinal migraine   • Cervical polyp        Past Medical History:   Diagnosis Date   • Constipation         Past Surgical History:   Procedure Laterality Date   • D&C HYSTEROSCOPY  2014   • DIAGNOSTIC LAPAROSCOPY     • SHOULDER SURGERY     • SHOULDER SURGERY Left        Visit Dx:     ICD-10-CM ICD-9-CM   1. Shoulder impingement, left M75.42 726.2   2. Decreased shoulder mobility, left M25.612 719.51             Patient History     Row Name 18 0800             History    Chief Complaint Difficulty with daily activities;Muscle weakness;Pain  -RB      Type of Pain Shoulder pain  -RB      Brief Description of Current Complaint Reports longstanding hx of L shoulder pn. Surgery  for labral repair, bicep tenodesis, recovered well until summer of last year believes she overworked it at the gym. Had MRI and diagnosed w/ tendonitis, deferred PT. Over past few months has progressively lost strength and mobility. Unable to tolerate sleeping on the L side. Pn constant, located on the top and back of the shoulder radiating into the upper arm. Having difficulty w/ upper body dressing, reaching behind herself, reaching OH. Lifts weights, able to do rowing motion w/ little pn or difficulty, but OH significantly limited and painful.  Has been on anti-inflammatory since beginning of this week. Sees Dr. Agustin 5/10/2018.  -RB      Previous treatment for THIS PROBLEM Surgery;Rehabilitation;Medication  -RB      Patient/Caregiver Goals Relieve pain;Return to prior level of function;Improve mobility;Improve strength;Know what to do to help the symptoms  -RB      Current Tobacco Use None  -RB      Patient's  Rating of General Health Very good  -RB      Hand Dominance right-handed  -RB      Occupation/sports/leisure activities Employed as a  w/ [employer], works from home, on computer long periods of time. Enjoys exercising, lifting weights.  -RB      Patient seeing anyone else for problem(s)? Yes  -RB      How has patient tried to help current problem? modification w/ weightlifting, medication  -RB         Pain     Pain Location Shoulder  -RB      Pain at Present 1  -RB      Pain at Best 1  -RB      Pain at Worst --   varies depending on activity  -RB      Pain Frequency Constant/continuous  -RB      What Performance Factors Make the Current Problem(s) WORSE? lifting OH, reaching behind her back.  -RB      Is your sleep disturbed? Yes  -RB         Fall Risk Assessment    Any falls in the past year: No  -RB         Daily Activities    Primary Language English  -RB      Are you able to read Yes  -RB      Are you able to write Yes  -RB      How does patient learn best? Listening;Reading;Demonstration  -RB      Teaching needs identified Home Exercise Program;Management of Condition  -RB      Patient is concerned about/has problems with Difficulty with self care (i.e. bathing, dressing, toileting:;Flexibility;Performing home management (household chores, shopping, care of dependents);Performing job responsibilities/community activities (work, school,;Performing sports, recreation, and play activities;Reaching over head;Repetitive movements of the hand, arm, shoulder  -RB      Does patient have problems with the following? None  -RB      Barriers to learning None  -RB      Pt Participated in POC and Goals Yes  -RB         Safety    Are you being hurt, hit, or frightened by anyone at home or in your life? No  -RB      Are you being neglected by a caregiver No  -RB        User Key  (r) = Recorded By, (t) = Taken By, (c) = Cosigned By    Initials Name Provider Type    RB Debbie Lovelace, PT Physical Therapist                PT  Ortho     Row Name 03/29/18 0800       Posture/Observations    Posture/Observations Comments demo mild forward shoulder posture  -RB       Special Tests/Palpation    Special Tests/Palpation Shoulder  -RB       Shoulder Girdle Palpation    Subacromial Space Tender  -RB    Infraspinatus Tender  -RB    Teres Minor Tender  -RB    Greater Tubercule Tender  -RB    Shoulder Girdle Palpation? Yes  -RB       Shoulder Girdle Accessory Motions    Shoulder Girdle Accessory Motions Tested? Yes  -RB    Posterior glide of humerus Left:;Hypomobile  -RB    Inferior glide of humerus Left:;Hypomobile  -RB       Shoulder Impingement/Rotator Cuff Special Tests    Poon-Robbie Test (RC Lesion vs. Bursitis) Positive  -RB    Neer Impingement Test (RC Lesion vs. Bursitis) Positive  -RB    Empty Can Test (RC Lesion) Negative   mild weakness, minimal pn  -RB    Lift-Off Test (Subscapularis Lesion) Negative  -RB    Belly Press (Subscapularis Lesion) Negative  -RB    Speed's Test (LH of Biceps Lesion) Negative  -RB       Biceps/Labral Special Tests    Marston's Test (Labral Test) Negative  -RB    Biceps I (Biceps Tendinopathy vs. Labral Tear) Negative  -RB       General ROM    LT Upper Ext Lt Shoulder ABduction;Lt Shoulder Extension;Lt Shoulder Flexion;Lt Shoulder External Rotation;Lt Shoulder Internal Rotation  -RB       Left Upper Ext    Lt Shoulder Abduction AROM 86  -RB    Lt Shoulder Abduction PROM 115  -RB    Lt Shoulder Extension AROM 46   R 74 deg  -RB    Lt Shoulder Flexion AROM 127   pn and tightness  -RB    Lt Shoulder Flexion PROM 125  -RB    Lt Shoulder External Rotation AROM 50  -RB    Lt Shoulder External Rotation PROM 30   at 90 deg abd  -RB    Lt Shoulder Internal Rotation AROM to mid low back  -RB    Lt Shoulder Internal Rotation PROM 30 deg  -RB       MMT (Manual Muscle Testing)    Additional Documentation General Assessment (Manual Muscle Testing) (Group)  -RB       General Assessment (Manual Muscle Testing)    General  Manual Muscle Testing (MMT) Assessment upper extremity strength deficits identified  -RB       Upper Extremity (Manual Muscle Testing)    Upper Extremity: Manual Muscle Testing (MMT) left shoulder strength deficit;left elbow/forearm strength deficit  -RB       Left Shoulder (Manual Muscle Testing)    Left Shoulder Manual Muscle Testing (MMT) flexion;abduction;external rotation;internal rotation  -RB    MMT: Flexion, Left Shoulder flexion  -RB    MMT, Gross Movement: Left Shoulder Flexion --   4+/5  -RB    MMT: ABduction, Left Shoulder abduction  -RB    MMT, Gross Movement: Left Shoulder ABduction --   4+/5  -RB    MMT: Internal Rotation, Left Shoulder internal rotation  -RB    MMT, Gross Movement: Left Shoulder Internal Rotation (5/5) normal  -RB    MMT: External Rotation, Left Shoulder external rotation  -RB    MMT, Gross Movement: Left Shoulder External Rotation (4/5) good  -RB       Left Elbow/Forearm (Manual Muscle Testing)    Left Elbow/Forearm Manual Muscle Testing (MMT) flexion;supination  -RB    MMT: Flexion, Left Elbow flexion  -RB    MMT, Gross Movement: Left Elbow Flexion (5/5) normal  -RB    MMT: Supination, Left Forearm supination  -RB    MMT, Gross Movement: Left Forearm Supination (5/5) normal  -RB      User Key  (r) = Recorded By, (t) = Taken By, (c) = Cosigned By    Initials Name Provider Type    RB Debbie Lovelace, PT Physical Therapist                      Therapy Education  Education Details: issued initial HEP including posterior capsulestretch, doorway stretch, wand extension, and ER stretch w/ cane. Discussed use of heat prior to stretching, ice afterward for pn control. Educated re: avoidance of repetitive overhead activity, modifying exercise to avoid pn.  Given: HEP, Symptoms/condition management, Pain management  Program: New  How Provided: Verbal, Demonstration, Written  Provided to: Patient  Level of Understanding: Teach back education performed           PT OP Goals     Row Name 03/29/18  0855 03/29/18 0800       PT Short Term Goals    STG Date to Achieve  -- 04/26/18  -RB    STG 1  -- Pt to be compliant w/ initial HEP for flexibility, self mobilization techniques, and symptom mgmt  -RB    STG 1 Progress  -- New  -RB    STG 2  -- Pt to report at least a 25% reduction in symptoms  -RB    STG 2 Progress  -- New  -RB    STG 3  -- Pt to improve AROM of the L shoulder to at least 130 deg flexion and abd, 70 deg ER.  -RB    STG 3 Progress  -- New  -RB    STG 4  -- Pt to improve ER strength to at least 4+/5.  -RB    STG 4 Progress  -- New  -RB       Long Term Goals    LTG Date to Achieve  -- 05/24/18  -RB    LTG 1  -- Pt to be independent w/ long term HEP for strengthening, flexibility, and symptom mgmt  -RB    LTG 1 Progress  -- New  -RB    LTG 2  -- Pt to report at least a 50% reduction in pn.  -RB    LTG 2 Progress  -- New  -RB    LTG 3  -- Pt to improve AROM of the L shoulder to at least 160 deg flexion and abd.  -RB    LTG 3 Progress  -- New  -RB    LTG 4  -- Pt to demo IR L=R.  -RB    LTG 4 Progress  -- New  -RB    LTG 5  -- Pt to improve QD score by at least 25% to reflect improved pn and function.  -RB    LTG 5 Progress  -- New  -RB       Time Calculation    PT Goal Re-Cert Due Date 06/27/18  -RB 06/27/18  -RB      User Key  (r) = Recorded By, (t) = Taken By, (c) = Cosigned By    Initials Name Provider Type    RB Debbie Lovelace, PT Physical Therapist                PT Assessment/Plan     Row Name 03/29/18 0851          PT Assessment    Functional Limitations Limitations in functional capacity and performance;Performance in leisure activities;Performance in self-care ADL  -RB     Impairments Range of motion;Muscle strength;Pain;Joint mobility;Impaired muscle endurance;Impaired muscle length;Impaired muscle power  -RB     Assessment Comments Pt presents w/ complaint of chronic L shoulder pn. Findings consistent w/ shoulder impingement, adhesive capsulitis. She demonstrates deficits in accessory GH  joint/capsular mobility, shoulder strength/ROM, flexibility limiting tolerance to daily and recreational activities. She would benefit from skilled PT services to address deficits, decrease pn, and allow return to PLOF.  -RB     Please refer to paper survey for additional self-reported information Yes  -RB     Rehab Potential Good  -RB     Patient/caregiver participated in establishment of treatment plan and goals Yes  -RB     Patient would benefit from skilled therapy intervention Yes  -RB        PT Plan    PT Frequency 1x/week  -RB     Predicted Duration of Therapy Intervention (OT Eval) 8-12 visits  -RB     Planned CPT's? PT EVAL LOW COMPLEXITY: 74512;PT RE-EVAL: 93704;PT THER PROC EA 15 MIN: 76227;PT MANUAL THERAPY EA 15 MIN: 76326;PT NEUROMUSC RE-EDUCATION EA 15 MIN: 57814;PT HOT/COLD PACK WC NONMCARE: 39683;PT ULTRASOUND EA 15 MIN: 00977;PT IONTOPHORESIS EA 15 MIN: 57905  -RB     PT Plan Comments PT POC to include progressive scapular stabilization, RTC strengthening, stretching, manual therapy techniques, modalities as indicated for pn control.  -RB       User Key  (r) = Recorded By, (t) = Taken By, (c) = Cosigned By    Initials Name Provider Type    RB Debbie Lovelace, PT Physical Therapist                              Outcome Measure Options: Quick DASH  Quick DASH  Open a tight or new jar.: Mild Difficulty  Do heavy household chores (e.g., wash walls, wash floors): Moderate Difficulty  Carry a shopping bag or briefcase: Mild Difficulty  Wash your back: Moderate Difficulty  Use a knife to cut food: Mild Difficulty  Recreational activities in which you take some force or impact through your arm, should or hand (e.g. golf, hammering, tennis, etc.): Severe Difficulty  During the past week, to what extent has your arm, shoulder, or hand problem interfered with your normal social activites with family, friends, neighbors or groups?: Quite a bit  During the past week, were you limited in your work or other regular  daily activities as a result of your arm, shoulder or hand problem?: Very limited  Arm, Shoulder, or hand pain: Moderate  Tingling (pins and needles) in your arm, shoulder, or hand: Moderate  During the past week, how much difficulty have you had sleeping because of the pain in your arm, shoulder or hand?: Moderate Difficiculty  Number of Questions Answered: 11  Quick DASH Score: 50         Time Calculation:   Start Time: 0800     Therapy Charges for Today     Code Description Service Date Service Provider Modifiers Qty    33908093190  PT EVAL LOW COMPLEXITY 3 3/29/2018 Debbie Lovelace, PT GP 1          PT G-Codes  Outcome Measure Options: Quick DASH         Debbie Lovelace, PT  3/29/2018

## 2018-04-03 ENCOUNTER — HOSPITAL ENCOUNTER (OUTPATIENT)
Dept: PHYSICAL THERAPY | Facility: HOSPITAL | Age: 53
Setting detail: THERAPIES SERIES
Discharge: HOME | End: 2018-04-03

## 2018-04-03 DIAGNOSIS — M25.612 DECREASED SHOULDER MOBILITY, LEFT: ICD-10-CM

## 2018-04-03 DIAGNOSIS — M75.42 SHOULDER IMPINGEMENT, LEFT: Primary | ICD-10-CM

## 2018-04-03 PROCEDURE — 97140 MANUAL THERAPY 1/> REGIONS: CPT | Performed by: PHYSICAL THERAPIST

## 2018-04-03 PROCEDURE — 97110 THERAPEUTIC EXERCISES: CPT | Performed by: PHYSICAL THERAPIST

## 2018-04-03 NOTE — THERAPY TREATMENT NOTE
Outpatient Physical Therapy Ortho Treatment Note  [facility]     Patient Name: Delma Mccullough  : 1965  MRN: [id number]  Today's Date: 4/3/2018      Visit Date: 2018    Visit Dx:    ICD-10-CM ICD-9-CM   1. Shoulder impingement, left M75.42 726.2   2. Decreased shoulder mobility, left M25.612 719.51       Patient Active Problem List   Diagnosis   • Left flank pain   • Positive PATRICK (antinuclear antibody)   • Post herpetic neuralgia   • Retinal migraine   • Cervical polyp        Past Medical History:   Diagnosis Date   • Constipation         Past Surgical History:   Procedure Laterality Date   • D&C HYSTEROSCOPY  2014   • DIAGNOSTIC LAPAROSCOPY     • SHOULDER SURGERY     • SHOULDER SURGERY Left              PT Ortho     Row Name 18 1500       Subjective Comments    Subjective Comments Pt reports pn and mobility seem to be about the same. Stretches a little painful, but performing daily.  -RB       Subjective Pain    Pre-Treatment Pain Level 4  -RB    Post-Treatment Pain Level 3  -RB      User Key  (r) = Recorded By, (t) = Taken By, (c) = Cosigned By    Initials Name Provider Type    CELSO Lovelace, PT Physical Therapist                            PT Assessment/Plan     Row Name 18 1603          PT Assessment    Assessment Comments No change in AROM this visit. Pt demo good technique w/ stretches issued as HEP, but has increased pn w/ low level ROM and stretching in clinic. Pn most pronounced in posterior shoulder, radiating down back of arm to elbow.  -RB        PT Plan    PT Plan Comments Cont w/ low level ROM/scap stabilization as pt tolerates. Use MHP w/ to augment stretches  -RB       User Key  (r) = Recorded By, (t) = Taken By, (c) = Cosigned By    Initials Name Provider Type    CELSO Lovelace PT Physical Therapist                    Exercises     Row Name 18 1500             Subjective Comments    Subjective Comments Pt reports pn and mobility seem to be about the  same. Stretches a little painful, but performing daily.  -RB         Subjective Pain    Pre-Treatment Pain Level 4  -RB      Post-Treatment Pain Level 3  -RB         Exercise 1    Exercise Name 1 Initiated ther ex in clinic as per flow sheet w/ focus on low level AA/AROM, stretching.  -RB      Time 1 30  -RB        User Key  (r) = Recorded By, (t) = Taken By, (c) = Cosigned By    Initials Name Provider Type    CELSO Lovelace PT Physical Therapist                        Manual Rx (last 36 hours)      Manual Treatments     Row Name 04/03/18 1500             Manual Rx 1    Manual Rx 1 Location L shoulder  -RB      Manual Rx 1 Type inferior/posterior glides  -RB      Manual Rx 1 Grade Gr II-III  -RB      Manual Rx 1 Duration 8 min  -RB        User Key  (r) = Recorded By, (t) = Taken By, (c) = Cosigned By    Initials Name Provider Type    CELSO Lovelace PT Physical Therapist                PT OP Goals     Row Name 04/03/18 1604          Time Calculation    PT Goal Re-Cert Due Date 06/27/18  -RB       User Key  (r) = Recorded By, (t) = Taken By, (c) = Cosigned By    Initials Name Provider Type    CELSO Lovelace PT Physical Therapist          Therapy Education  Education Details: updated HEP to include SL ER and seated scaption to 90 deg  Given: HEP  Program: Modified  How Provided: Verbal, Demonstration, Written  Provided to: Patient  Level of Understanding: Teach back education performed              Time Calculation:   Start Time: 1515  Total Timed Code Minutes- PT: 38 minute(s)    Therapy Charges for Today     Code Description Service Date Service Provider Modifiers Qty    30132330137 HC PT THER PROC EA 15 MIN 4/3/2018 Debbie Lovelace, PT GP 2    01526903154 HC PT MANUAL THERAPY EA 15 MIN 4/3/2018 Debbie Lovelace, PT GP 1                    Debbie Lovelace, PT  4/3/2018

## 2018-04-12 ENCOUNTER — HOSPITAL ENCOUNTER (OUTPATIENT)
Dept: PHYSICAL THERAPY | Facility: HOSPITAL | Age: 53
Setting detail: THERAPIES SERIES
Discharge: HOME | End: 2018-04-12

## 2018-04-12 DIAGNOSIS — M25.612 DECREASED SHOULDER MOBILITY, LEFT: ICD-10-CM

## 2018-04-12 DIAGNOSIS — M75.42 SHOULDER IMPINGEMENT, LEFT: Primary | ICD-10-CM

## 2018-04-12 PROCEDURE — 97110 THERAPEUTIC EXERCISES: CPT | Performed by: PHYSICAL THERAPIST

## 2018-04-12 PROCEDURE — 97140 MANUAL THERAPY 1/> REGIONS: CPT | Performed by: PHYSICAL THERAPIST

## 2018-04-12 NOTE — THERAPY TREATMENT NOTE
Outpatient Physical Therapy Ortho Treatment Note  [facility]     Patient Name: Delma Mccullough  : 1965  MRN: [id number]  Today's Date: 2018      Visit Date: 2018    Visit Dx:    ICD-10-CM ICD-9-CM   1. Shoulder impingement, left M75.42 726.2   2. Decreased shoulder mobility, left M25.612 719.51       Patient Active Problem List   Diagnosis   • Left flank pain   • Positive PATRICK (antinuclear antibody)   • Post herpetic neuralgia   • Retinal migraine   • Cervical polyp        Past Medical History:   Diagnosis Date   • Constipation         Past Surgical History:   Procedure Laterality Date   • D&C HYSTEROSCOPY  2014   • DIAGNOSTIC LAPAROSCOPY     • SHOULDER SURGERY     • SHOULDER SURGERY Left              PT Ortho     Row Name 18 1400       Subjective Comments    Subjective Comments Able to get deodorant on w/ less difficulty. Has the most pn and difficulty w/ SL ER, causes pn down back of arm to elbow.  Has noticed more grinding in her shoulder w/ movement.  -RB       Subjective Pain    Pre-Treatment Pain Level 3  -RB    Post-Treatment Pain Level 5  -RB       Left Upper Ext    Lt Shoulder Abduction AROM 90  -RB    Lt Shoulder Flexion AROM 140  -RB    Lt Shoulder External Rotation AROM 42  -RB      User Key  (r) = Recorded By, (t) = Taken By, (c) = Cosigned By    Initials Name Provider Type    CELSO Lovelace, PT Physical Therapist                            PT Assessment/Plan     Row Name 18 0975          PT Assessment    Assessment Comments Flexion ROM improved, but other planes continue to be limited, all planes painful. Signs appear to be indicative of labral pathology but unclear as most testing reproduces symptoms.  -RB        PT Plan    PT Plan Comments Cont to progress as tolerated.  -RB       User Key  (r) = Recorded By, (t) = Taken By, (c) = Cosigned By    Initials Name Provider Type    CELSO Lovelace, PT Physical Therapist                    Exercises     Row Name  04/12/18 1400             Subjective Comments    Subjective Comments Able to get deodorant on w/ less difficulty. Has the most pn and difficulty w/ SL ER, causes pn down back of arm to elbow.  Has noticed more grinding in her shoulder w/ movement.  -RB         Subjective Pain    Pre-Treatment Pain Level 3  -RB      Post-Treatment Pain Level 5  -RB         Exercise 1    Exercise Name 1 Continued w/ ther ex in clinic per flow sheet. Added shoulder pulleys, rows/low rows w/ TB, standing ER w/ TB.  -RB      Time 1 20  -RB        User Key  (r) = Recorded By, (t) = Taken By, (c) = Cosigned By    Initials Name Provider Type    CELSO Lovelace PT Physical Therapist                        Manual Rx (last 36 hours)      Manual Treatments     Row Name 04/12/18 1500             Manual Rx 1    Manual Rx 1 Location L shoulder  -RB      Manual Rx 1 Type inferior/posterior glides; PROM all planes  -RB      Manual Rx 1 Grade Gr II-III  -RB      Manual Rx 1 Duration 10 min  -RB        User Key  (r) = Recorded By, (t) = Taken By, (c) = Cosigned By    Initials Name Provider Type    CELSO Lovelace PT Physical Therapist                PT OP Goals     Row Name 04/12/18 1557          Time Calculation    PT Goal Re-Cert Due Date 06/27/18  -RB       User Key  (r) = Recorded By, (t) = Taken By, (c) = Cosigned By    Initials Name Provider Type    CELSO Lovelace PT Physical Therapist          Therapy Education  Education Details: issued pulleys and provided RTB for addition of rows to HEP  Given: HEP  Program: Modified  How Provided: Verbal, Demonstration  Provided to: Patient  Level of Understanding: Teach back education performed              Time Calculation:   Start Time: 1415  Total Timed Code Minutes- PT: 30 minute(s)    Therapy Charges for Today     Code Description Service Date Service Provider Modifiers Qty    25295263667 HC PT THER PROC EA 15 MIN 4/12/2018 Debbie Lovelace, PT GP 1    [id number]  PT MANUAL THERAPY EA 15  MIN 4/12/2018 Debbie Lovelace, PT GP 1                    Debbie Lovelace, PT  4/12/2018

## 2018-04-17 ENCOUNTER — HOSPITAL ENCOUNTER (OUTPATIENT)
Dept: PHYSICAL THERAPY | Facility: HOSPITAL | Age: 53
Setting detail: THERAPIES SERIES
Discharge: HOME | End: 2018-04-17

## 2018-04-17 DIAGNOSIS — M25.612 DECREASED SHOULDER MOBILITY, LEFT: ICD-10-CM

## 2018-04-17 DIAGNOSIS — M75.42 SHOULDER IMPINGEMENT, LEFT: Primary | ICD-10-CM

## 2018-04-17 PROCEDURE — 97140 MANUAL THERAPY 1/> REGIONS: CPT | Performed by: PHYSICAL THERAPIST

## 2018-04-17 PROCEDURE — 97110 THERAPEUTIC EXERCISES: CPT | Performed by: PHYSICAL THERAPIST

## 2018-04-17 NOTE — THERAPY TREATMENT NOTE
Outpatient Physical Therapy Ortho Treatment Note  [facility]     Patient Name: Delma Mccullough  : 1965  MRN: [id number]  Today's Date: 2018      Visit Date: 2018    Visit Dx:    ICD-10-CM ICD-9-CM   1. Shoulder impingement, left M75.42 726.2   2. Decreased shoulder mobility, left M25.612 719.51       Patient Active Problem List   Diagnosis   • Left flank pain   • Positive PATRICK (antinuclear antibody)   • Post herpetic neuralgia   • Retinal migraine   • Cervical polyp        Past Medical History:   Diagnosis Date   • Constipation         Past Surgical History:   Procedure Laterality Date   • D&C HYSTEROSCOPY  2014   • DIAGNOSTIC LAPAROSCOPY     • SHOULDER SURGERY     • SHOULDER SURGERY Left              PT Ortho     Row Name 18 1200       Subjective Comments    Subjective Comments Overall pn and mobility seem to be about the same.  -RB       Subjective Pain    Pre-Treatment Pain Level 3  -RB    Post-Treatment Pain Level 5  -RB      User Key  (r) = Recorded By, (t) = Taken By, (c) = Cosigned By    Initials Name Provider Type    CELSO Lovelace, PT Physical Therapist                            PT Assessment/Plan     Row Name 18 1256          PT Assessment    Assessment Comments Mobility not significantly changed today. PROM/joint mobilizations continues to be somewhat limited by pn in the posterior shoulder/upper arm.  -RB        PT Plan    PT Plan Comments Cont as tolerated.  -RB       User Key  (r) = Recorded By, (t) = Taken By, (c) = Cosigned By    Initials Name Provider Type    CELSO Lovelace, PT Physical Therapist                    Exercises     Row Name 18 1200             Subjective Comments    Subjective Comments Overall pn and mobility seem to be about the same.  -RB         Subjective Pain    Pre-Treatment Pain Level 3  -RB      Post-Treatment Pain Level 5  -RB         Exercise 1    Exercise Name 1 Continued w/ ther ex in clinic as per flow sheet. Added wall  slides, scaption w/ shoulder pulleys. Reviewed HEP additions  -RB      Time 1 30  -RB        User Key  (r) = Recorded By, (t) = Taken By, (c) = Cosigned By    Initials Name Provider Type    CELSO Lovelace PT Physical Therapist                        Manual Rx (last 36 hours)      Manual Treatments     Row Name 04/17/18 1100             Manual Rx 1    Manual Rx 1 Location L shoulder  -RB      Manual Rx 1 Type inferior/posterior glides; PROM all planes  -RB      Manual Rx 1 Grade Gr II-III  -RB      Manual Rx 1 Duration 10 min  -RB        User Key  (r) = Recorded By, (t) = Taken By, (c) = Cosigned By    Initials Name Provider Type    RB Debbie Lovelace PT Physical Therapist                PT OP Goals     Row Name 04/17/18 1258          Time Calculation    PT Goal Re-Cert Due Date 06/27/18  -RB       User Key  (r) = Recorded By, (t) = Taken By, (c) = Cosigned By    Initials Name Provider Type    RB Debbie Lovelace PT Physical Therapist          Therapy Education  Education Details: updated HEP as outlined in chart  Given: HEP  Program: Progressed  How Provided: Demonstration, Written, Verbal  Provided to: Patient  Level of Understanding: Teach back education performed              Time Calculation:   Start Time: 0930  Total Timed Code Minutes- PT: 40 minute(s)    Therapy Charges for Today     Code Description Service Date Service Provider Modifiers Qty    04680382264 HC PT THER PROC EA 15 MIN 4/17/2018 Debbie Lovelace, PT GP 2    61128689662 HC PT MANUAL THERAPY EA 15 MIN 4/17/2018 Debbie Lovelace, PT GP 1                    Debbie Lovelace, PT  4/17/2018

## 2018-04-24 ENCOUNTER — HOSPITAL ENCOUNTER (OUTPATIENT)
Dept: PHYSICAL THERAPY | Facility: HOSPITAL | Age: 53
Setting detail: THERAPIES SERIES
Discharge: HOME | End: 2018-04-24

## 2018-04-24 DIAGNOSIS — M75.42 SHOULDER IMPINGEMENT, LEFT: Primary | ICD-10-CM

## 2018-04-24 DIAGNOSIS — M25.612 DECREASED SHOULDER MOBILITY, LEFT: ICD-10-CM

## 2018-04-24 PROCEDURE — 97110 THERAPEUTIC EXERCISES: CPT | Performed by: PHYSICAL THERAPIST

## 2018-04-24 NOTE — THERAPY PROGRESS REPORT/RE-CERT
Outpatient Physical Therapy Ortho Re-Assessment   [place]     Patient Name: Delma Mccullough  : 1965  MRN: [id number]  Today's Date: 2018      Visit Date: 2018    Patient Active Problem List   Diagnosis   • Left flank pain   • Positive PATRICK (antinuclear antibody)   • Post herpetic neuralgia   • Retinal migraine   • Cervical polyp        Past Medical History:   Diagnosis Date   • Constipation         Past Surgical History:   Procedure Laterality Date   • D&C HYSTEROSCOPY  2014   • DIAGNOSTIC LAPAROSCOPY     • SHOULDER SURGERY     • SHOULDER SURGERY Left        Visit Dx:     ICD-10-CM ICD-9-CM   1. Shoulder impingement, left M75.42 726.2   2. Decreased shoulder mobility, left M25.612 719.51                 PT Ortho     Row Name 18 1000       Subjective Comments    Subjective Comments Can tell that she has made gains in her ROM, especially w/ OH movement and reaching behind her back. Strength also seems improved, but pn overall unchanged. Seenelly Agustin for consult May 10  -RB       Subjective Pain    Pre-Treatment Pain Level 1  -RB    Post-Treatment Pain Level 1  -RB       Shoulder Impingement/Rotator Cuff Special Tests    Poon-Robbie Test (RC Lesion vs. Bursitis) Positive  -RB    Neer Impingement Test (RC Lesion vs. Bursitis) Positive  -RB    Empty Can Test (RC Lesion) Positive   weak, low to moderate pn  -RB       Biceps/Labral Special Tests    Nondalton's Test (Labral Test) Positive  -RB       Left Upper Ext    Lt Shoulder Abduction AROM 85   tightness, then pn  -RB    Lt Shoulder Extension AROM 55  -RB    Lt Shoulder Flexion AROM 140   tightness, then pn  -RB    Lt Shoulder External Rotation AROM 45  -RB    Lt Shoulder Internal Rotation AROM to mid back  -RB       Left Shoulder (Manual Muscle Testing)    MMT: Flexion, Left Shoulder flexion  -RB    MMT, Gross Movement: Left Shoulder Flexion (5/5) normal  -RB    MMT: ABduction, Left Shoulder abduction  -RB    MMT, Gross Movement:    8/17/2017      RE: Anshu Root  [address]  [place] [address]       Pediatric Hematology/Oncology Clinic Note     ID: Anshu Root is a 7 year old male with 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 He presented with URI symptoms, decreased appetite, LLL pneumonia, intermittent low grade fevers, bilateral leg pain, pallor, severe anemia (Hgb 3.4), thrombocytopenia (plts 14K) and neutropenia with abnormal lymphocytes on CBC. 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 Diagnostic LP revealed CNS 1 status (negative). Day 8 PB MRD negative. Day 29 marrow was MRD negative making him low risk. He was treated on [facility] protocol [id number] for Induction therapy. Given accrual goals had been met, he is now being treated per the standard of care according which is the AR Arm. Anshu comes to [facility] with his mom and sister for Day 1 of his 5th Maintenance cycle.      HPI:   Anshu has done very well since he was in clinic a month ago although he is very worked up again this morning in sedation because he does not want the sleepy medicine. Mom has no concerns today. No fevers or infections. Energy and appetite are at or above baseline. No n/v/d/c. Denies pain, numbness or tingling in hand and feet. No tripping or falling. Normal bowel movements. No bruising or bleeding. Sleeping well.      ROS:   10 point ROS neg other than the symptoms noted above in the HPI. Baseline neuropsychology testing in summer of 2016 revealed ADHD and situational anxiety. F/u testing recommended in 1 year.      PMH:   Treated for strep pharyngitis and left posterior cervical LAD in July 2015  Viral URI w/ wheezing requiring albuterol in November 2015  ALL - Jan 2016  Human metapneumovirus - Jan 2016  Strength deficits, including drop foot - Feb 2016  RSV - March 2016  Vitamin D insufficiency- March 2016  Vitamin D sufficiency achieved- July 2016  Vomiting with heparin flushes- July  2016  ADHD- 2016  Right AOM- 2016  Right AOM- 2016     PFMH:   Unchanged     Social History:   Anshu lives in with his mom and 3 year old sister. They recently moved from [place] to [place]. Biological father is not involved. Maternal grandfather is a strong source of support. Mom is attending classes at [employer] (formerly [employer]) and will get her RN degree in a year. She is also working at a [employer]. Anshu will start  in a couple of weeks with an IEP in place     Current Medications:   Current Outpatient Prescriptions   Medication Sig Dispense Refill     [START ON 2017] sulfamethoxazole-trimethoprim (BACTRIM/SEPTRA) suspension Take 5 mLs (40 mg) by mouth Every Mon, Tu two times daily Dose based on TMP component. 100 mL 3     LORazepam (ATIVAN) 0.5 MG tablet Take 2 tablets (1 mg) by mouth once as needed for anxiety (Give 30 minutes prior to medical appointment) May attempt to administer pill with a bite of food or crush and mix with food to administer. 10 tablet 0     dexamethasone (DECADRON) 0.5 MG tablet Take 2.5mg (5 tabs) in the morning and 2.25mg (4.5 tabs) in the evening x 5 days every 4 weeks after oncology clinic visits. 48 tablet 2     mercaptopurine (PURINETHOL) 50 MG tablet CHEMO Take 1.5 tablets (75 mg) by mouth daily Dosin.75mg/m2/day (125% full dose). Deliver to [facility]. 42 tablet 2     methotrexate 2.5 MG tablet CHEMO Take 8 tablets (20 mg) by mouth once a week On  except weeks of lumbar puncture with IT chemo. 32 tablet 2     ondansetron (ZOFRAN ODT) 4 MG ODT tab Take 0.5 tablets (2 mg) by mouth every 6 hours as needed for nausea 20 tablet 1     dexamethasone (DECADRON) 0.5 MG tablet Take 4.5 tablets (2.25 mg) by mouth 2 times daily (with meals) Take x 5 days every 4 weeks after oncology clinic visits. 45 tablet 2     methotrexate 2.5 MG tablet CHEMO Take 6 tablets (15 mg) by mouth once a week On  except weeks of lumbar  Left Shoulder ABduction (4/5) good   moderate to high pn lateral/posterior upper arm  -RB    MMT: Internal Rotation, Left Shoulder internal rotation  -RB    MMT, Gross Movement: Left Shoulder Internal Rotation (5/5) normal  -RB    MMT: External Rotation, Left Shoulder external rotation  -RB    MMT, Gross Movement: Left Shoulder External Rotation (4+/5) good plus  -RB    Comment, MMT: Left Shoulder scaption 4/5 w/ low to moderate pn  -RB       Left Elbow/Forearm (Manual Muscle Testing)    MMT: Flexion, Left Elbow flexion  -RB    MMT, Gross Movement: Left Elbow Flexion (5/5) normal  -RB    MMT: Supination, Left Forearm supination  -RB    MMT, Gross Movement: Left Forearm Supination (5/5) normal  -RB      User Key  (r) = Recorded By, (t) = Taken By, (c) = Cosigned By    Initials Name Provider Type    RB Debbie Lovelace, PT Physical Therapist                                  PT OP Goals     Row Name 04/24/18 1258 04/24/18 1200       PT Short Term Goals    STG Date to Achieve  -- 04/26/18  -RB    STG 1  -- Pt to be compliant w/ initial HEP for flexibility, self mobilization techniques, and symptom mgmt  -RB    STG 1 Progress  -- Met  -RB    STG 2  -- Pt to report at least a 25% reduction in symptoms  -RB    STG 2 Progress  -- Not Met  -RB    STG 3  -- Pt to improve AROM of the L shoulder to at least 130 deg flexion and abd, 70 deg ER.  -RB    STG 3 Progress  -- Not Met  -RB    STG 4  -- Pt to improve ER strength to at least 4+/5.  -RB    STG 4 Progress  -- Met  -RB       Long Term Goals    LTG Date to Achieve  -- 05/24/18  -RB    LTG 1  -- Pt to be independent w/ long term HEP for strengthening, flexibility, and symptom mgmt  -RB    LTG 1 Progress  -- Progressing  -RB    LTG 2  -- Pt to report at least a 50% reduction in pn.  -RB    LTG 2 Progress  -- Not Met  -RB    LTG 3  -- Pt to improve AROM of the L shoulder to at least 160 deg flexion and abd.  -RB    LTG 3 Progress  -- Not Met  -RB    LTG 4  -- Pt to demo IR L=R.   -RB    LTG 4 Progress  -- Not Met  -RB    LTG 5  -- Pt to improve QD score by at least 25% to reflect improved pn and function.  -RB    LTG 5 Progress  -- Not Met  -RB       Time Calculation    PT Goal Re-Cert Due Date 06/27/18  -RB 06/27/18  -RB      User Key  (r) = Recorded By, (t) = Taken By, (c) = Cosigned By    Initials Name Provider Type    RB Debbie Lovelace, PT Physical Therapist                PT Assessment/Plan     Row Name 04/24/18 1256          PT Assessment    Functional Limitations Limitations in functional capacity and performance;Performance in leisure activities;Performance in self-care ADL  -RB     Impairments Range of motion;Muscle strength;Pain;Joint mobility;Impaired muscle endurance;Impaired muscle length;Impaired muscle power  -RB     Assessment Comments Pt has made minimal progress w/ strength, ROM, pn, and function over 5 PT visits. She has made limited progress toward and continues to be limited w/ most daily activities 2/2 pn. Findings upon reassessment today consistent w/ adhesive capsulitis and likely underlying RTC impingement and/or labral pathology.  -RB     Please refer to paper survey for additional self-reported information Yes  -RB     Rehab Potential Fair  -RB     Patient/caregiver participated in establishment of treatment plan and goals Yes  -RB        PT Plan    Planned CPT's? PT RE-EVAL: 57430;PT THER PROC EA 15 MIN: 34306;PT MANUAL THERAPY EA 15 MIN: 46322;PT NEUROMUSC RE-EDUCATION EA 15 MIN: 46797;PT ELECTRICAL STIM UNATTEND: ;PT HOT/COLD PACK WC NONMCARE: 65057  -RB     PT Plan Comments Will hold PT services until pt completes ortho consult.  -RB       User Key  (r) = Recorded By, (t) = Taken By, (c) = Cosigned By    Initials Name Provider Type    CELSO Lovelace, PT Physical Therapist                  Exercises     Row Name 04/24/18 1000             Subjective Comments    Subjective Comments Can tell that she has made gains in her ROM, especially w/ OH movement and  puncture with IT chemo. 24 tablet 2     lidocaine-prilocaine (EMLA) cream Apply topically as needed for moderate pain (apply 30 minutes prior to port access) 30 g 3     cholecalciferol (VITAMIN D/ D-VI-SOL) 400 UNIT/ML LIQD liquid Take 2 mLs (800 Units) by mouth daily 60 mL 1     acetaminophen (TYLENOL) 160 MG/5ML oral liquid Take 7.5 mLs (240 mg) by mouth every 6 hours as needed for mild pain or fever 473 mL 1     polyethylene glycol (MIRALAX/GLYCOLAX) packet Take 17 g by mouth daily (Patient taking differently: Take 17 g by mouth daily as needed ) 255 g 1     diphenhydrAMINE (BENADRYL) 12.5 MG/5ML liquid Take 8.15 mLs (20.375 mg) by mouth every 6 hours as needed for itching 120 mL 1     albuterol (2.5 MG/3ML) 0.083% nebulizer solution Take 1 vial (2.5 mg) by nebulization every 6 hours as needed for shortness of breath / dyspnea or wheezing 30 vial 0     order for DME Equipment being ordered: Nebulizer 1 each 0         Above medications were reviewed with Anshu Pulidoffee &/or family, and Anshu Root.   Oral chemotherapy was administered correctly and there were no missed doses.        Physical Exam:   VS: T 97.6, R 17, , BP 93/64, Sat 100%, Height 115.7 cm, Weight 20.5 kg (up 0.5 kg over last month)  General: Alert, interactive and age-appropriate. Anshu prefers to be distracted by his tablet due to anxiety over being sedated.   HEENT: Skull is atrauamatic and normocephalic. Full head of hair. PERRLA, sclera are non icteric and not injected, EOM are intact. Nares patent. Oropharynx is clear without exudate, erythema or lesions.      Lymph:  Neck is supple without lymphadenopathy.  There is no supraclavicular, axillary or inguinal lymphadenopathy palpated.  Cardiovascular: HR is regular. Normal S1, S2, no murmur.  Capillary refill is < 2 seconds.  There is no edema.  Respiratory: Respirations are easy.  Lungs are clear to auscultation through out.  No crackles or wheezes. No cough noted.   Gastrointestinal:  BS  present in all quadrants.  Abdomen is soft and non-tender. No hepatosplenomegaly or masses are palpated.   Skin: No rashes, bruises. Port site is C/D/I.   Neurological: Alert and oriented. No focal deficits. Sensation intact in hands.   Musculoskeletal: Ambulates independently. Passive ankle dorsiflexion without heel cord tightness. Strength 4/5 with ankle dorsiflexion. Gait is normal.       Labs:  Recent Results (from the past 72 hour(s))   CBC with platelets differential    Collection Time: 08/17/17 10:30 AM   Result Value Ref Range    WBC 5.0 5.0 - 14.5 10e9/L    RBC Count 4.62 3.7 - 5.3 10e12/L    Hemoglobin 12.7 10.5 - 14.0 g/dL    Hematocrit 36.1 31.5 - 43.0 %    MCV 78 70 - 100 fl    MCH 27.5 26.5 - 33.0 pg    MCHC 35.2 31.5 - 36.5 g/dL    RDW 14.5 10.0 - 15.0 %    Platelet Count 151 150 - 450 10e9/L    Diff Method Automated Method     % Neutrophils 48.2 %    % Lymphocytes 42.6 %    % Monocytes 7.0 %    % Eosinophils 1.2 %    % Basophils 0.6 %    % Immature Granulocytes 0.4 %    Nucleated RBCs 0 0 /100    Absolute Neutrophil 2.4 1.3 - 8.1 10e9/L    Absolute Lymphocytes 2.1 1.1 - 8.6 10e9/L    Absolute Monocytes 0.4 0.0 - 1.1 10e9/L    Absolute Eosinophils 0.1 0.0 - 0.7 10e9/L    Absolute Basophils 0.0 0.0 - 0.2 10e9/L    Abs Immature Granulocytes 0.0 0 - 0.4 10e9/L    Absolute Nucleated RBC 0.0    Comprehensive metabolic panel    Collection Time: 08/17/17 10:30 AM   Result Value Ref Range    Sodium 141 133 - 143 mmol/L    Potassium 4.0 3.4 - 5.3 mmol/L    Chloride 109 98 - 110 mmol/L    Carbon Dioxide 24 20 - 32 mmol/L    Anion Gap 8 3 - 14 mmol/L    Glucose 86 70 - 99 mg/dL    Urea Nitrogen 8 (L) 9 - 22 mg/dL    Creatinine 0.28 0.15 - 0.53 mg/dL    GFR Estimate GFR not calculated, patient <16 years old. mL/min/1.7m2    GFR Estimate If Black GFR not calculated, patient <16 years old. mL/min/1.7m2    Calcium 9.2 9.1 - 10.3 mg/dL    Bilirubin Total 0.5 0.2 - 1.3 mg/dL    Albumin 4.0 3.4 - 5.0 g/dL    Protein  reaching behind her back. Strength also seems improved, but pn overall unchanged. Addie Agustin for consult May 10  -RB         Subjective Pain    Pre-Treatment Pain Level 1  -RB      Post-Treatment Pain Level 1  -RB         Exercise 1    Exercise Name 1 Performed reassessment then reviewed HEP.  -RB      Time 1 30  -RB        User Key  (r) = Recorded By, (t) = Taken By, (c) = Cosigned By    Initials Name Provider Type    RB Debbie Lovelace, PT Physical Therapist                        Outcome Measure Options: Quick DASH  Quick DASH  Open a tight or new jar.: Moderate Difficulty  Do heavy household chores (e.g., wash walls, wash floors): Severe Difficulty  Carry a shopping bag or briefcase: Mild Difficulty  Wash your back: Moderate Difficulty  Use a knife to cut food: Mild Difficulty  Recreational activities in which you take some force or impact through your arm, should or hand (e.g. golf, hammering, tennis, etc.): Severe Difficulty  During the past week, to what extent has your arm, shoulder, or hand problem interfered with your normal social activites with family, friends, neighbors or groups?: Not at all  During the past week, were you limited in your work or other regular daily activities as a result of your arm, shoulder or hand problem?: Moderately Limited  Arm, Shoulder, or hand pain: Mild  Tingling (pins and needles) in your arm, shoulder, or hand: Mild  During the past week, how much difficulty have you had sleeping because of the pain in your arm, shoulder or hand?: Mild Difficulty  Number of Questions Answered: 11  Quick DASH Score: 38.64         Time Calculation:   Start Time: 1030  Total Timed Code Minutes- PT: 30 minute(s)     Therapy Charges for Today     Code Description Service Date Service Provider Modifiers Qty    68192462615 HC PT THER PROC EA 15 MIN 4/24/2018 Debbie Lovelace, PT GP 2          PT G-Codes  Outcome Measure Options: Quick DASH         Debbie Lovelace, PT  4/24/2018       Total 7.3 6.5 - 8.4 g/dL    Alkaline Phosphatase 284 150 - 420 U/L    ALT 20 0 - 50 U/L    AST 20 0 - 50 U/L   Cell count with differential CSF: Tube 1    Collection Time: 08/17/17 11:30 AM   Result Value Ref Range    WBC CSF 2 0 - 5 /uL    RBC CSF 1 0 - 2 /uL    Tube Number 1 #    Color CSF Colorless CLRL^Colorless    Appearance CSF Clear CLER^Clear        Procedure:   Lumbar puncture to obtain CSF and administration of intrathecal chemotherapy. Procedure, benefits, risks, and alternatives explained to the patient's mother who voiced understanding of the information and agreed to proceed with lumbar puncture.  Risks include bleeding and or infection. Area prepped and draped in a sterile fashion. 22 gauge, length: 1.5 cm needle placed between third and fourth vertebrate and 3 mL spinal fluid collected. Afterward syringe with 6 mL MTX was applied to the needle and administered intrathecally. Specimen appears clear and colorless. Specimen sent for cell count, differential and cytology. Complications: none including bleeding, very stable.      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 He's doing very well. ANC is surprisingly still well above targeted therapeutic range at 125% dosing for both 6MP and MTX. Given that he was dose increased last month we will only increase his doses a modest amount for weight and if he continues to be high at the next visit, we will consider a dose increase at that time. It might also be helpful to obtain metabolites at the next visit given lack of significant change in ANC with increases in medication. 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      Plan:   1) IV vincristine in clinic   2) Start 5-day (10 doses) decadron burst (4.5 mg in AM and 4.25 mg in PM)  3) Increase 6-MP to 1.5 tabs  (75 mg) daily = 125%.  4) Increase MTX to 8 tabs (20 mg) weekly = 125% except for weeks with LP  5) Monitor peripheral neuropathy  6) Refills provided for Bactrim and Ativan  7) RTC in 4 weeks for Day 29 of Cycle 5 of maintenance therapy     Brian Chatmna MD  Pediatric Hematology/Oncology  [facility]  Pager [phone]

## 2018-05-17 ENCOUNTER — DOCUMENTATION (OUTPATIENT)
Dept: PHYSICAL THERAPY | Facility: HOSPITAL | Age: 53
End: 2018-05-17

## 2018-05-17 DIAGNOSIS — M25.612 DECREASED SHOULDER MOBILITY, LEFT: ICD-10-CM

## 2018-05-17 DIAGNOSIS — M75.42 SHOULDER IMPINGEMENT, LEFT: Primary | ICD-10-CM

## 2018-05-17 NOTE — THERAPY DISCHARGE NOTE
Outpatient Physical Therapy Discharge Summary         Patient Name: Delma Mccullough  : 1965  MRN: [id number]    Today's Date: 2018    Visit Dx:    ICD-10-CM ICD-9-CM   1. Shoulder impingement, left M75.42 726.2   2. Decreased shoulder mobility, left M25.612 719.51             PT OP Goals     Row Name 18 1407          PT Short Term Goals    STG Date to Achieve 18  -RB     STG 1 Pt to be compliant w/ initial HEP for flexibility, self mobilization techniques, and symptom mgmt  -RB     STG 1 Progress Met  -RB     STG 2 Pt to report at least a 25% reduction in symptoms  -RB     STG 2 Progress Not Met  -RB     STG 3 Pt to improve AROM of the L shoulder to at least 130 deg flexion and abd, 70 deg ER.  -RB     STG 3 Progress Not Met  -RB     STG 4 Pt to improve ER strength to at least 4+/5.  -RB     STG 4 Progress Met  -RB        Long Term Goals    LTG Date to Achieve 18  -RB     LTG 1 Pt to be independent w/ long term HEP for strengthening, flexibility, and symptom mgmt  -RB     LTG 1 Progress Partially Met  -RB     LTG 2 Pt to report at least a 50% reduction in pn.  -RB     LTG 2 Progress Not Met  -RB     LTG 3 Pt to improve AROM of the L shoulder to at least 160 deg flexion and abd.  -RB     LTG 3 Progress Not Met  -RB     LTG 4 Pt to demo IR L=R.  -RB     LTG 4 Progress Not Met  -RB     LTG 5 Pt to improve QD score by at least 25% to reflect improved pn and function.  -RB     LTG 5 Progress Not Met  -RB       User Key  (r) = Recorded By, (t) = Taken By, (c) = Cosigned By    Initials Name Provider Type    CELSO Lovelace, PT Physical Therapist          OP PT Discharge Summary  Date of Discharge: 18  Reason for Discharge: Lack of progress  Outcomes Achieved: Unable to make functional progress toward goals at this time  Discharge Destination: Home with home program (ortho consult)  Discharge Instructions/Additional Comments: Pt made minimal progress w/ pn, mobility, or function  over course of 5 PT visits and was referred back to referral source for consult to ortho for further evaluation.      Time Calculation:                    Debbie Lovelace, PT  5/17/2018

## 2018-11-13 ENCOUNTER — OFFICE VISIT (OUTPATIENT)
Dept: INTERNAL MEDICINE | Facility: CLINIC | Age: 53
End: 2018-11-13

## 2018-11-13 VITALS
OXYGEN SATURATION: 98 % | BODY MASS INDEX: 25.52 KG/M2 | SYSTOLIC BLOOD PRESSURE: 120 MMHG | WEIGHT: 163 LBS | DIASTOLIC BLOOD PRESSURE: 80 MMHG | HEART RATE: 61 BPM

## 2018-11-13 DIAGNOSIS — Z00.00 ROUTINE GENERAL MEDICAL EXAMINATION AT A HEALTH CARE FACILITY: Primary | ICD-10-CM

## 2018-11-13 LAB
25(OH)D3 SERPL-MCNC: 38 NG/ML
ALBUMIN SERPL-MCNC: 4.52 G/DL (ref 3.2–4.8)
ALBUMIN/GLOB SERPL: 2 G/DL (ref 1.5–2.5)
ALP SERPL-CCNC: 38 U/L (ref 25–100)
ALT SERPL W P-5'-P-CCNC: 19 U/L (ref 7–40)
ANION GAP SERPL CALCULATED.3IONS-SCNC: 3 MMOL/L (ref 3–11)
ARTICHOKE IGE QN: 61 MG/DL (ref 0–130)
AST SERPL-CCNC: 22 U/L (ref 0–33)
BILIRUB BLD-MCNC: NEGATIVE MG/DL
BILIRUB SERPL-MCNC: 0.6 MG/DL (ref 0.3–1.2)
BUN BLD-MCNC: 15 MG/DL (ref 9–23)
BUN/CREAT SERPL: 18.8 (ref 7–25)
CALCIUM SPEC-SCNC: 9.5 MG/DL (ref 8.7–10.4)
CHLORIDE SERPL-SCNC: 109 MMOL/L (ref 99–109)
CHOLEST SERPL-MCNC: 142 MG/DL (ref 0–200)
CLARITY, POC: CLEAR
CO2 SERPL-SCNC: 30 MMOL/L (ref 20–31)
COLOR UR: YELLOW
CREAT BLD-MCNC: 0.8 MG/DL (ref 0.6–1.3)
DEPRECATED RDW RBC AUTO: 42.6 FL (ref 37–54)
ERYTHROCYTE [DISTWIDTH] IN BLOOD BY AUTOMATED COUNT: 12.7 % (ref 11.3–14.5)
GFR SERPL CREATININE-BSD FRML MDRD: 76 ML/MIN/1.73
GLOBULIN UR ELPH-MCNC: 2.3 GM/DL
GLUCOSE BLD-MCNC: 87 MG/DL (ref 70–100)
GLUCOSE UR STRIP-MCNC: NEGATIVE MG/DL
HCT VFR BLD AUTO: 39.7 % (ref 34.5–44)
HDLC SERPL-MCNC: 67 MG/DL (ref 40–60)
HGB BLD-MCNC: 12.9 G/DL (ref 11.5–15.5)
KETONES UR QL: NEGATIVE
LEUKOCYTE EST, POC: NEGATIVE
MCH RBC QN AUTO: 29.7 PG (ref 27–31)
MCHC RBC AUTO-ENTMCNC: 32.5 G/DL (ref 32–36)
MCV RBC AUTO: 91.5 FL (ref 80–99)
NITRITE UR-MCNC: NEGATIVE MG/ML
PH UR: 6 [PH] (ref 5–8)
PLATELET # BLD AUTO: 231 10*3/MM3 (ref 150–450)
PMV BLD AUTO: 11.5 FL (ref 6–12)
POTASSIUM BLD-SCNC: 4 MMOL/L (ref 3.5–5.5)
PROT SERPL-MCNC: 6.8 G/DL (ref 5.7–8.2)
PROT UR STRIP-MCNC: NEGATIVE MG/DL
RBC # BLD AUTO: 4.34 10*6/MM3 (ref 3.89–5.14)
RBC # UR STRIP: NEGATIVE /UL
SODIUM BLD-SCNC: 142 MMOL/L (ref 132–146)
SP GR UR: 1.01 (ref 1–1.03)
TRIGL SERPL-MCNC: 63 MG/DL (ref 0–150)
TSH SERPL DL<=0.05 MIU/L-ACNC: 1.07 MIU/ML (ref 0.35–5.35)
UROBILINOGEN UR QL: NORMAL
WBC NRBC COR # BLD: 6.04 10*3/MM3 (ref 3.5–10.8)

## 2018-11-13 PROCEDURE — 80061 LIPID PANEL: CPT | Performed by: INTERNAL MEDICINE

## 2018-11-13 PROCEDURE — 80053 COMPREHEN METABOLIC PANEL: CPT | Performed by: INTERNAL MEDICINE

## 2018-11-13 PROCEDURE — 90471 IMMUNIZATION ADMIN: CPT | Performed by: INTERNAL MEDICINE

## 2018-11-13 PROCEDURE — 99396 PREV VISIT EST AGE 40-64: CPT | Performed by: INTERNAL MEDICINE

## 2018-11-13 PROCEDURE — 90715 TDAP VACCINE 7 YRS/> IM: CPT | Performed by: INTERNAL MEDICINE

## 2018-11-13 PROCEDURE — 90472 IMMUNIZATION ADMIN EACH ADD: CPT | Performed by: INTERNAL MEDICINE

## 2018-11-13 PROCEDURE — 81003 URINALYSIS AUTO W/O SCOPE: CPT | Performed by: INTERNAL MEDICINE

## 2018-11-13 PROCEDURE — 85027 COMPLETE CBC AUTOMATED: CPT | Performed by: INTERNAL MEDICINE

## 2018-11-13 PROCEDURE — 82306 VITAMIN D 25 HYDROXY: CPT | Performed by: INTERNAL MEDICINE

## 2018-11-13 PROCEDURE — 84443 ASSAY THYROID STIM HORMONE: CPT | Performed by: INTERNAL MEDICINE

## 2018-11-13 PROCEDURE — 90674 CCIIV4 VAC NO PRSV 0.5 ML IM: CPT | Performed by: INTERNAL MEDICINE

## 2018-11-13 NOTE — PROGRESS NOTES
Chief Complaint   Patient presents with   • Annual Exam       History of Present Illness  HM, Adult Female: The patient is being seen for a health maintenance and gynecology evaluation. The last health maintenance visit was 1 year(s) ago.   Social History: She is  with 1 child/son Gopi- 8 yo. 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  Planning to move to [employer]  She is a prior smoker. She reports socially drinking alcohol. Denies any illicit drug use.  General Health: The patient's health is described as good. She has regular dental visits, s/p recent root canal . She denies vision problems. She denies hearing loss. Immunizations status: up to date.   Lifestyle:. She consumes a diverse and healthy diet. She does not have any weight concerns. She exercises regularly. She denies tobacco. She denies alcohol use. She denies drug use.   Reproductive health:. She reports normal menses. Regular.  Screening: Cancer screening reviewed and current.   Metabolic screening reviewed and current.   Risk screening reviewed and current.     She is doing well overall. Has seen Allison Juarez and had a wart taken out.    Review of Systems   Constitutional: Negative.  Negative for chills and fever.   HENT: Negative for ear discharge, ear pain, sinus pressure and sore throat.    Respiratory: Negative for cough, chest tightness and shortness of breath.    Cardiovascular: Negative for chest pain, palpitations and leg swelling.   Gastrointestinal: Negative for diarrhea, nausea and vomiting.   Musculoskeletal: Negative for arthralgias, back pain and myalgias.   Neurological: Negative for dizziness, syncope and headaches.   Psychiatric/Behavioral: Negative for confusion and sleep disturbance.       Patient Active Problem List   Diagnosis   • Left flank pain   • Positive PATRICK (antinuclear antibody)   • Post herpetic neuralgia   • Retinal migraine   • Cervical polyp       Social History     Socioeconomic History    • Marital status:      Spouse name: Not on file   • Number of children: Not on file   • Years of education: Not on file   • Highest education level: Not on file   Social Needs   • Financial resource strain: Not on file   • Food insecurity - worry: Not on file   • Food insecurity - inability: Not on file   • Transportation needs - medical: Not on file   • Transportation needs - non-medical: Not on file   Occupational History   • Not on file   Tobacco Use   • Smoking status: Former Smoker     Packs/day: 0.25     Years: 15.00     Pack years: 3.75     Types: Cigarettes     Last attempt to quit: 1999     Years since quittin.4   • Smokeless tobacco: Never Used   Substance and Sexual Activity   • Alcohol use: Not on file     Comment: socially   • Drug use: No   • Sexual activity: Defer   Other Topics Concern   • Not on file   Social History Narrative   • Not on file       Current Outpatient Medications on File Prior to Visit   Medication Sig Dispense Refill   • Multiple Vitamins-Minerals (MULTIVITAMIN ADULT) tablet Take 1 tablet by mouth Daily.       No current facility-administered medications on file prior to visit.        Allergies   Allergen Reactions   • Tramadol Mental Status Change       /80   Pulse 61   Wt 73.9 kg (163 lb)   SpO2 98% Comment: ra  BMI 25.52 kg/m²            The following portions of the patient's history were reviewed and updated as appropriate: allergies, current medications, past family history, past medical history, past social history, past surgical history and problem list.    Physical Exam   Constitutional: She is oriented to person, place, and time. She appears well-developed and well-nourished.   HENT:   Head: Normocephalic and atraumatic.   Right Ear: External ear normal.   Left Ear: External ear normal.   Mouth/Throat: No oropharyngeal exudate.   Eyes: Conjunctivae are normal. Pupils are equal, round, and reactive to light. No scleral icterus.   Neck: Neck  supple. No thyromegaly present.   Cardiovascular: Normal rate, regular rhythm and intact distal pulses. Exam reveals no friction rub.   No murmur heard.  Pulmonary/Chest: Effort normal and breath sounds normal. No stridor. She has no wheezes. She has no rales.   Abdominal: Soft. Bowel sounds are normal. She exhibits no distension and no mass. There is no tenderness. There is no guarding.   Musculoskeletal: She exhibits no edema.   Lymphadenopathy:     She has no cervical adenopathy.   Neurological: She is alert and oriented to person, place, and time. She displays normal reflexes. No cranial nerve deficit. She exhibits normal muscle tone. Coordination normal.   Skin: Skin is warm and dry.   Psychiatric: She has a normal mood and affect. Her behavior is normal. Judgment and thought content normal.   Nursing note and vitals reviewed.      Results for orders placed or performed in visit on 11/13/18   CBC (No Diff)   Result Value Ref Range    WBC 6.04 3.50 - 10.80 10*3/mm3    RBC 4.34 3.89 - 5.14 10*6/mm3    Hemoglobin 12.9 11.5 - 15.5 g/dL    Hematocrit 39.7 34.5 - 44.0 %    MCV 91.5 80.0 - 99.0 fL    MCH 29.7 27.0 - 31.0 pg    MCHC 32.5 32.0 - 36.0 g/dL    RDW 12.7 11.3 - 14.5 %    RDW-SD 42.6 37.0 - 54.0 fl    MPV 11.5 6.0 - 12.0 fL    Platelets 231 150 - 450 10*3/mm3   Comprehensive Metabolic Panel   Result Value Ref Range    Glucose 87 70 - 100 mg/dL    BUN 15 9 - 23 mg/dL    Creatinine 0.80 0.60 - 1.30 mg/dL    Sodium 142 132 - 146 mmol/L    Potassium 4.0 3.5 - 5.5 mmol/L    Chloride 109 99 - 109 mmol/L    CO2 30.0 20.0 - 31.0 mmol/L    Calcium 9.5 8.7 - 10.4 mg/dL    Total Protein 6.8 5.7 - 8.2 g/dL    Albumin 4.52 3.20 - 4.80 g/dL    ALT (SGPT) 19 7 - 40 U/L    AST (SGOT) 22 0 - 33 U/L    Alkaline Phosphatase 38 25 - 100 U/L    Total Bilirubin 0.6 0.3 - 1.2 mg/dL    eGFR Non African Amer 76 >60 mL/min/1.73    Globulin 2.3 gm/dL    A/G Ratio 2.0 1.5 - 2.5 g/dL    BUN/Creatinine Ratio 18.8 7.0 - 25.0    Anion Gap  3.0 3.0 - 11.0 mmol/L   Lipid Panel   Result Value Ref Range    Total Cholesterol 142 0 - 200 mg/dL    Triglycerides 63 0 - 150 mg/dL    HDL Cholesterol 67 (H) 40 - 60 mg/dL    LDL Cholesterol  61 0 - 130 mg/dL   TSH   Result Value Ref Range    TSH 1.073 0.350 - 5.350 mIU/mL   Vitamin D 25 Hydroxy   Result Value Ref Range    25 Hydroxy, Vitamin D 38.0 ng/ml   POCT urinalysis dipstick, automated   Result Value Ref Range    Color Yellow Yellow, Straw, Dark Yellow, Dana    Clarity, UA Clear Clear    Specific Gravity  1.015 1.005 - 1.030    pH, Urine 6.0 5.0 - 8.0    Leukocytes Negative Negative    Nitrite, UA Negative Negative    Protein, POC Negative Negative mg/dL    Glucose, UA Negative Negative, 1000 mg/dL (3+) mg/dL    Ketones, UA Negative Negative    Urobilinogen, UA Normal Normal    Bilirubin Negative Negative    Blood, UA Negative Negative       Delma was seen today for annual exam.    Diagnoses and all orders for this visit:    Routine general medical examination at a health care facility  -     POCT urinalysis dipstick, automated  -     Tdap Vaccine Greater Than or Equal To 8yo IM  -     CBC (No Diff)  -     Comprehensive Metabolic Panel  -     Lipid Panel  -     TSH  -     Vitamin D 25 Hydroxy    Other orders  -     Flucelvax Quad (Vial) =>4 yrs ([id number])          Health Maintenance   Topic Date Due   • ZOSTER VACCINE (1 of 2) 10/01/2017   • PAP SMEAR  11/28/2018 (Originally 6/7/2016)   • ANNUAL PHYSICAL  11/14/2019   • MAMMOGRAM  01/25/2020   • COLONOSCOPY  09/30/2021   • TDAP/TD VACCINES (2 - Td) 11/13/2028   • INFLUENZA VACCINE  Completed       Discussion/Summary  Impression: health maintenance visit. Currently, she eats an adequate diet and has an adequate exercise regimen.   Cervical cancer screening:Pap smear is current.   Breast cancer screening: mammogram is current.   Colorectal cancer screening: colonoscopy is current.  Osteoporosis screening: Bone mineral density test is not indicated .    Screening lab work includes hemoglobin, glucose, lipid profile, thyroid function testing, 25-hydroxyvitamin D and urinalysis.   Immunizations are needed, immunizations will be given as outlined in the orders     Return in about 1 year (around 11/13/2019).

## 2019-02-12 ENCOUNTER — TRANSCRIBE ORDERS (OUTPATIENT)
Dept: INTERNAL MEDICINE | Facility: CLINIC | Age: 54
End: 2019-02-12

## 2019-02-12 DIAGNOSIS — Z12.31 VISIT FOR SCREENING MAMMOGRAM: Primary | ICD-10-CM

## 2019-03-27 ENCOUNTER — HOSPITAL ENCOUNTER (OUTPATIENT)
Dept: MAMMOGRAPHY | Facility: HOSPITAL | Age: 54
Discharge: HOME | End: 2019-03-27
Admitting: INTERNAL MEDICINE

## 2019-03-27 DIAGNOSIS — Z12.31 VISIT FOR SCREENING MAMMOGRAM: ICD-10-CM

## 2019-03-27 PROCEDURE — 77067 SCR MAMMO BI INCL CAD: CPT

## 2019-03-27 PROCEDURE — 77063 BREAST TOMOSYNTHESIS BI: CPT | Performed by: RADIOLOGY

## 2019-03-27 PROCEDURE — 77067 SCR MAMMO BI INCL CAD: CPT | Performed by: RADIOLOGY

## 2019-03-27 PROCEDURE — 77063 BREAST TOMOSYNTHESIS BI: CPT

## 2020-05-11 ENCOUNTER — APPOINTMENT (OUTPATIENT)
Dept: LAB | Facility: HOSPITAL | Age: 55
End: 2020-05-11

## 2020-05-11 ENCOUNTER — OFFICE VISIT (OUTPATIENT)
Dept: OBSTETRICS AND GYNECOLOGY | Facility: CLINIC | Age: 55
End: 2020-05-11

## 2020-05-11 VITALS
SYSTOLIC BLOOD PRESSURE: 118 MMHG | BODY MASS INDEX: 26.68 KG/M2 | WEIGHT: 170 LBS | HEIGHT: 67 IN | DIASTOLIC BLOOD PRESSURE: 68 MMHG

## 2020-05-11 DIAGNOSIS — N93.9 ABNORMAL UTERINE BLEEDING (AUB): Primary | ICD-10-CM

## 2020-05-11 LAB
25(OH)D3 SERPL-MCNC: 37.4 NG/ML (ref 30–100)
ALBUMIN SERPL-MCNC: 4.7 G/DL (ref 3.5–5.2)
ALBUMIN/GLOB SERPL: 1.5 G/DL
ALP SERPL-CCNC: 48 U/L (ref 39–117)
ALT SERPL W P-5'-P-CCNC: 13 U/L (ref 1–33)
ANION GAP SERPL CALCULATED.3IONS-SCNC: 10.2 MMOL/L (ref 5–15)
AST SERPL-CCNC: 19 U/L (ref 1–32)
BILIRUB SERPL-MCNC: 0.6 MG/DL (ref 0.2–1.2)
BUN BLD-MCNC: 15 MG/DL (ref 6–20)
BUN/CREAT SERPL: 17.4 (ref 7–25)
CALCIUM SPEC-SCNC: 9.9 MG/DL (ref 8.6–10.5)
CHLORIDE SERPL-SCNC: 98 MMOL/L (ref 98–107)
CHOLEST SERPL-MCNC: 167 MG/DL (ref 0–200)
CO2 SERPL-SCNC: 28.8 MMOL/L (ref 22–29)
CREAT BLD-MCNC: 0.86 MG/DL (ref 0.57–1)
DEPRECATED RDW RBC AUTO: 39.6 FL (ref 37–54)
ERYTHROCYTE [DISTWIDTH] IN BLOOD BY AUTOMATED COUNT: 12 % (ref 12.3–15.4)
ESTRADIOL SERPL HS-MCNC: 173 PG/ML
FSH SERPL-ACNC: 22.3 MIU/ML
GFR SERPL CREATININE-BSD FRML MDRD: 69 ML/MIN/1.73
GLOBULIN UR ELPH-MCNC: 3.1 GM/DL
GLUCOSE BLD-MCNC: 94 MG/DL (ref 65–99)
HBA1C MFR BLD: 5.4 % (ref 4.8–5.6)
HCT VFR BLD AUTO: 41.9 % (ref 34–46.6)
HGB BLD-MCNC: 14.3 G/DL (ref 12–15.9)
MCH RBC QN AUTO: 30.8 PG (ref 26.6–33)
MCHC RBC AUTO-ENTMCNC: 34.1 G/DL (ref 31.5–35.7)
MCV RBC AUTO: 90.3 FL (ref 79–97)
PLATELET # BLD AUTO: 210 10*3/MM3 (ref 140–450)
PMV BLD AUTO: 10.9 FL (ref 6–12)
POTASSIUM BLD-SCNC: 4.2 MMOL/L (ref 3.5–5.2)
PROT SERPL-MCNC: 7.8 G/DL (ref 6–8.5)
RBC # BLD AUTO: 4.64 10*6/MM3 (ref 3.77–5.28)
SODIUM BLD-SCNC: 137 MMOL/L (ref 136–145)
TSH SERPL DL<=0.05 MIU/L-ACNC: 2.32 UIU/ML (ref 0.27–4.2)
WBC NRBC COR # BLD: 6.27 10*3/MM3 (ref 3.4–10.8)

## 2020-05-11 PROCEDURE — 84443 ASSAY THYROID STIM HORMONE: CPT | Performed by: OBSTETRICS & GYNECOLOGY

## 2020-05-11 PROCEDURE — 85027 COMPLETE CBC AUTOMATED: CPT | Performed by: OBSTETRICS & GYNECOLOGY

## 2020-05-11 PROCEDURE — 99214 OFFICE O/P EST MOD 30 MIN: CPT | Performed by: OBSTETRICS & GYNECOLOGY

## 2020-05-11 PROCEDURE — 83036 HEMOGLOBIN GLYCOSYLATED A1C: CPT | Performed by: OBSTETRICS & GYNECOLOGY

## 2020-05-11 PROCEDURE — 36415 COLL VENOUS BLD VENIPUNCTURE: CPT | Performed by: OBSTETRICS & GYNECOLOGY

## 2020-05-11 PROCEDURE — 83001 ASSAY OF GONADOTROPIN (FSH): CPT | Performed by: OBSTETRICS & GYNECOLOGY

## 2020-05-11 PROCEDURE — 80053 COMPREHEN METABOLIC PANEL: CPT | Performed by: OBSTETRICS & GYNECOLOGY

## 2020-05-11 PROCEDURE — 82306 VITAMIN D 25 HYDROXY: CPT | Performed by: OBSTETRICS & GYNECOLOGY

## 2020-05-11 PROCEDURE — 82465 ASSAY BLD/SERUM CHOLESTEROL: CPT | Performed by: OBSTETRICS & GYNECOLOGY

## 2020-05-11 PROCEDURE — 82670 ASSAY OF TOTAL ESTRADIOL: CPT | Performed by: OBSTETRICS & GYNECOLOGY

## 2020-05-11 PROCEDURE — 58100 BIOPSY OF UTERUS LINING: CPT | Performed by: OBSTETRICS & GYNECOLOGY

## 2020-05-11 NOTE — PROGRESS NOTES
Subjective     Chief Complaint   Patient presents with   • Follow-up     irregular periods x 10 months spotting bleeding every day from March to 2 weeks ago.  Light notices with wiping.  Patient needs pap smear will schedule that appointment       Delma Mccullough is a 54 y.o. year old  presenting to be seen for AUB.      History of Present Illness  Location:  vaginal  Signs/Symptoms:  Painless bleeding   Duration:  6 weeks  Severity:  Variable flow usually just spotting  Timing:  unpredicatble  Modifying Factors:  none  Rare HF. No NS. No vaginal dryness. Until one year ago menses were  Regular. Over the last year they have been unchanged in character but frequency would vary from monthly to every third month. The unpredictable spotting has been present for 6-8 weeks.    Past Medical History:   Diagnosis Date   • Constipation       Family History   Problem Relation Age of Onset   • Diabetes Mother    • Stroke Mother    • Colon cancer Father    • Kidney cancer Brother    • Dementia Other    • Breast cancer Neg Hx    • Ovarian cancer Neg Hx       Social History     Socioeconomic History   • Marital status:      Spouse name: Not on file   • Number of children: Not on file   • Years of education: Not on file   • Highest education level: Not on file   Tobacco Use   • Smoking status: Former Smoker     Packs/day: 0.25     Years: 15.00     Pack years: 3.75     Types: Cigarettes     Last attempt to quit: 1999     Years since quittin.9   • Smokeless tobacco: Never Used   Substance and Sexual Activity   • Drug use: No   • Sexual activity: Defer        Her LMP was No LMP recorded (lmp unknown)..  Her cycles are regular, predictable and consistent every 28 - 32 days.  Usually her flow is typically normal with no more than 0 days of heavy flow each menses.   Additionally she describes no other symptoms associated with her menses. AS above, until this past year when menstrual pattern deteriorated.    She is  "sexually active.  In the past 12 months there have not been new sexual partners.      She is  and is engaged in a homosexual relationship that involves penetration which is not and has not been painful nor associated with bleeding.She would not like to be screened for STD's at today's exam.     Current contraceptive methods being used: none and as above, in a same-sex relationship..  In the coming year, she is not considering changing her current contraceptive method.    She exercises regularly: not asked.  She wears her seat belt: yes.  She has concerns about domestic violence: not asked.    GYN screening history:  · Last pap: she reports her last PAP was normal  · Last mammogram: she reports her last mammogram was normal.    Additional Complaints:  none    The following portions of the patient's history were reviewed and updated as appropriate:vital signs, allergies, current medications, past medical history, past social history, past surgical history and problem list.    Review of Systems  A 14 point review of systems was negative except for: none    Objective   /68   Ht 170.2 cm (67.01\")   Wt 77.1 kg (170 lb)   LMP  (LMP Unknown)   Breastfeeding No   BMI 26.62 kg/m²   Physical Exam  General:  well developed; well nourished  no acute distress   Constitutional: healthy   Skin:  No suspicious lesions seen   Thyroid: normal to inspection and palpation   Lungs:  breathing is unlabored  clear to auscultation bilaterally   Heart:  regular rate and rhythm, S1, S2 normal, no murmur, click, rub or gallop   Breasts:  Not performed.   Abdomen: soft, non-tender; no masses  no umbilical or inginual hernias are present  no hepato-splenomegaly   Pelvis: Clinical staff was present for exam  External genitalia:  normal appearance of the external genitalia including Bartholin's and Ruhenstroth's glands.  :  urethral meatus normal; urethral hypermobility is absent.  Vaginal:  normal pink mucosa without prolapse or " lesions.  Cervix:  normal appearance  Uterus:  normal size, shape and consistency anteverted;  Adnexa:  normal bimanual exam of the adnexa.   Musculoskeletal: negative   Neuro: normal without focal findings, mental status, speech normal, alert and oriented x3 and ERIS   Psych: oriented to time, place and person, mood and affect are within normal limits, pt is a good historian; no memory problems were noted       Lab Review   No data reviewed    Imaging  No data reviewed    Assessment/Plan   Diagnosis:  1. Abnormal uterine bleeding (AUB)   We discussed the nature of perimenopausal bleeding and the likelihood of benign hormonal causes associated with more infrequent ovulation. We discussed the need for endometrial evaluation and exclusion of serious endometrial pathology. We discussed the approaches to this bleeding hormonal and surgical. She really is not bothered by the bleeding at this time and on the balance would choose a non interventional approach if appropriate after pathology review. We discussed the rle for US and although I dont think it is necessary at this time if Bx is tech successful I will orde for her next appt. We discussed other caused of AUB associated with other endocrinopathies ans well as gyn concerns other than the concern for endometrial hyperplasia/carcinoma     Endometrial Biopsy Procedure Note    Pre-operative Diagnosis: AUB    Post-operative Diagnosis: same    Indications: abnormal uterine bleeding    Procedure Details    Urine pregnancy test was not done.  The risks (including infection, bleeding, pain, and uterine perforation) and benefits of the procedure were explained to the patient and Written informed consent was obtained.  Antibiotic prophylaxis against endocarditis was not indicated.     The patient was placed in the dorsal lithotomy position.  Bimanual exam showed the uterus to be in the neutral position.  A Graves' speculum inserted in the vagina, and the cervix prepped with  povidone iodine.  Endocervical curettage with a Kevorkian curette was not performed.    A sterile uterine sound was used to sound the uterus to a depth of 8.5cm.  A Pipelle endometrial aspirator was used to sample the endometrium.  Sample was sent for pathologic examination.    Condition:  Stable    Complications:  None  Patient tolerated the procedure well without complications.  The patient was advised to call for any fever or for prolonged or severe pain or bleeding. She was advised to use NSAID as needed for mild to moderate pain. She was advised to avoid vaginal intercourse for 48 hours or until the bleeding has completely stopped.    Orders Placed This Encounter   Procedures   • US Non-ob Transvaginal     Standing Status:   Future     Standing Expiration Date:   5/11/2021     Scheduling Instructions:      With Annual exam in about three months     Order Specific Question:   Reason for Exam:     Answer:   AUB   • CBC (No Diff)   • Comprehensive Metabolic Panel   • Cholesterol, Total   • Follicle Stimulating Hormone   • Estradiol   • Hemoglobin A1c   • TSH   • Vitamin D 25 Hydroxy         Follow up: 3 month(s)   Annual and assess bleeding and menopausal symptoms  Obtain TVS... Sooner if endo path is abn         This note was electronically signed.    Josef Campos MD  May 11, 2020

## 2020-05-12 DIAGNOSIS — N93.9 ABNORMAL UTERINE BLEEDING (AUB): ICD-10-CM

## 2020-05-12 NOTE — PROGRESS NOTES
"Advise in addition to the labs, her endometrial bx result: benign. I think she may very well have more bleeding in the future and that she might consider \"cyclic monthly\" progesterone just to regular her bleeding for a few months. Certainly if she is not troubled by bleeding we canb just see how it goes. Call for problems.  "

## 2020-05-13 ENCOUNTER — TELEPHONE (OUTPATIENT)
Dept: OBSTETRICS AND GYNECOLOGY | Facility: CLINIC | Age: 55
End: 2020-05-13

## 2020-05-13 NOTE — TELEPHONE ENCOUNTER
"Patient advised of results of blood work normal and in premenopausal level.  Endometrial biopsy benign. Patient also advised per Dr Campos she may try monthly cyclic progesterone to regulate menses.     Patient verbally understood advise information and she would like to \"ride it out\" for now and have no medication called in at this time  "

## 2020-05-13 NOTE — TELEPHONE ENCOUNTER
----- Message from Josef Campos MD sent at 5/12/2020  4:32 PM EDT -----      ----- Message -----  From: Mignon Claudio RegSched Rep  Sent: 5/12/2020   4:21 PM EDT  To: Josef Campos MD

## 2020-08-17 ENCOUNTER — LAB (OUTPATIENT)
Dept: LAB | Facility: HOSPITAL | Age: 55
End: 2020-08-17

## 2020-08-17 ENCOUNTER — OFFICE VISIT (OUTPATIENT)
Dept: INTERNAL MEDICINE | Facility: CLINIC | Age: 55
End: 2020-08-17

## 2020-08-17 VITALS
TEMPERATURE: 98 F | OXYGEN SATURATION: 98 % | BODY MASS INDEX: 26.97 KG/M2 | SYSTOLIC BLOOD PRESSURE: 128 MMHG | HEIGHT: 67 IN | WEIGHT: 171.8 LBS | HEART RATE: 61 BPM | DIASTOLIC BLOOD PRESSURE: 70 MMHG

## 2020-08-17 DIAGNOSIS — G47.09 OTHER INSOMNIA: ICD-10-CM

## 2020-08-17 DIAGNOSIS — R31.9 HEMATURIA, UNSPECIFIED TYPE: ICD-10-CM

## 2020-08-17 DIAGNOSIS — Z00.00 ROUTINE GENERAL MEDICAL EXAMINATION AT A HEALTH CARE FACILITY: ICD-10-CM

## 2020-08-17 DIAGNOSIS — Z00.00 ROUTINE GENERAL MEDICAL EXAMINATION AT A HEALTH CARE FACILITY: Primary | ICD-10-CM

## 2020-08-17 LAB
25(OH)D3 SERPL-MCNC: 37.9 NG/ML (ref 30–100)
ALBUMIN SERPL-MCNC: 4.5 G/DL (ref 3.5–5.2)
ALBUMIN/GLOB SERPL: 1.9 G/DL
ALP SERPL-CCNC: 37 U/L (ref 39–117)
ALT SERPL W P-5'-P-CCNC: 18 U/L (ref 1–33)
ANION GAP SERPL CALCULATED.3IONS-SCNC: 8.5 MMOL/L (ref 5–15)
AST SERPL-CCNC: 21 U/L (ref 1–32)
BILIRUB BLD-MCNC: NEGATIVE MG/DL
BILIRUB SERPL-MCNC: 0.6 MG/DL (ref 0–1.2)
BUN SERPL-MCNC: 12 MG/DL (ref 6–20)
BUN/CREAT SERPL: 15.4 (ref 7–25)
CALCIUM SPEC-SCNC: 9.4 MG/DL (ref 8.6–10.5)
CHLORIDE SERPL-SCNC: 105 MMOL/L (ref 98–107)
CHOLEST SERPL-MCNC: 157 MG/DL (ref 0–200)
CLARITY, POC: CLEAR
CO2 SERPL-SCNC: 25.5 MMOL/L (ref 22–29)
COLOR UR: YELLOW
CREAT SERPL-MCNC: 0.78 MG/DL (ref 0.57–1)
GFR SERPL CREATININE-BSD FRML MDRD: 78 ML/MIN/1.73
GLOBULIN UR ELPH-MCNC: 2.4 GM/DL
GLUCOSE SERPL-MCNC: 83 MG/DL (ref 65–99)
GLUCOSE UR STRIP-MCNC: NEGATIVE MG/DL
HCV AB SER DONR QL: NORMAL
HDLC SERPL-MCNC: 58 MG/DL (ref 40–60)
KETONES UR QL: NEGATIVE
LDLC SERPL CALC-MCNC: 88 MG/DL (ref 0–100)
LDLC/HDLC SERPL: 1.51 {RATIO}
LEUKOCYTE EST, POC: NEGATIVE
NITRITE UR-MCNC: NEGATIVE MG/ML
PH UR: 6 [PH] (ref 5–8)
POTASSIUM SERPL-SCNC: 4.2 MMOL/L (ref 3.5–5.2)
PROT SERPL-MCNC: 6.9 G/DL (ref 6–8.5)
PROT UR STRIP-MCNC: NEGATIVE MG/DL
RBC # UR STRIP: ABNORMAL /UL
SODIUM SERPL-SCNC: 139 MMOL/L (ref 136–145)
SP GR UR: 1.01 (ref 1–1.03)
TRIGL SERPL-MCNC: 56 MG/DL (ref 0–150)
TSH SERPL DL<=0.05 MIU/L-ACNC: 1.66 UIU/ML (ref 0.27–4.2)
UROBILINOGEN UR QL: NORMAL
VLDLC SERPL-MCNC: 11.2 MG/DL (ref 5–40)

## 2020-08-17 PROCEDURE — 82306 VITAMIN D 25 HYDROXY: CPT | Performed by: INTERNAL MEDICINE

## 2020-08-17 PROCEDURE — 81003 URINALYSIS AUTO W/O SCOPE: CPT | Performed by: INTERNAL MEDICINE

## 2020-08-17 PROCEDURE — 87086 URINE CULTURE/COLONY COUNT: CPT | Performed by: INTERNAL MEDICINE

## 2020-08-17 PROCEDURE — 80061 LIPID PANEL: CPT | Performed by: INTERNAL MEDICINE

## 2020-08-17 PROCEDURE — 90471 IMMUNIZATION ADMIN: CPT | Performed by: INTERNAL MEDICINE

## 2020-08-17 PROCEDURE — 85027 COMPLETE CBC AUTOMATED: CPT | Performed by: INTERNAL MEDICINE

## 2020-08-17 PROCEDURE — 84443 ASSAY THYROID STIM HORMONE: CPT | Performed by: INTERNAL MEDICINE

## 2020-08-17 PROCEDURE — 80053 COMPREHEN METABOLIC PANEL: CPT | Performed by: INTERNAL MEDICINE

## 2020-08-17 PROCEDURE — 99396 PREV VISIT EST AGE 40-64: CPT | Performed by: INTERNAL MEDICINE

## 2020-08-17 PROCEDURE — 86803 HEPATITIS C AB TEST: CPT | Performed by: INTERNAL MEDICINE

## 2020-08-17 PROCEDURE — 90750 HZV VACC RECOMBINANT IM: CPT | Performed by: INTERNAL MEDICINE

## 2020-08-17 RX ORDER — TRAZODONE HYDROCHLORIDE 50 MG/1
TABLET ORAL
Qty: 60 TABLET | Refills: 5 | Status: SHIPPED | OUTPATIENT
Start: 2020-08-17 | End: 2021-08-17

## 2020-08-17 NOTE — PROGRESS NOTES
Chief Complaint   Patient presents with   • Annual Exam   • Stress     lost parent this winter   • Insomnia       History of Present Illness  HM, Adult Female: The patient is being seen for a health maintenance evaluation. The last health maintenance visit was 1 year(s) ago. and gynecology - Dr. Campos.  Social History: She is  with 1 child/son Gopi- 8 yo. 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 She reports socially drinking alcohol. Denies any illicit drug use.  General Health: The patient's health is described as good. She has regular dental visits, s/p recent root canal . She denies vision problems. She denies hearing loss. Immunizations status: up to date.   Lifestyle:. She consumes a diverse and healthy diet. She does not have any weight concerns. She exercises regularly. She denies tobacco. She denies alcohol use. She denies drug use.   Reproductive health:. She reports normal menses. Regular.but had a period of amenorrhea x 6 mos, thought Perimenopausal.  Screening: Cancer screening reviewed and current.   Metabolic screening reviewed and current.   Risk screening reviewed and current.      Both her parents passed away last winter.  Trouble sleeping , tried OTC without relief. Wakes up at 3 am.      Review of Systems   Constitutional: Negative for chills and fatigue.   HENT: Negative for congestion, ear pain and sore throat.    Eyes: Negative for pain, redness and visual disturbance.   Respiratory: Negative for cough and shortness of breath.    Cardiovascular: Negative for chest pain, palpitations and leg swelling.   Gastrointestinal: Negative for abdominal pain, diarrhea and nausea.   Endocrine: Negative for cold intolerance and heat intolerance.   Genitourinary: Negative for flank pain and urgency.   Musculoskeletal: Negative for arthralgias and gait problem.   Skin: Negative for pallor and rash.   Neurological: Negative  "for dizziness, weakness and headaches.   Psychiatric/Behavioral: Negative for dysphoric mood and sleep disturbance. The patient is not nervous/anxious.        Patient Active Problem List   Diagnosis   • Left flank pain   • Positive PATRICK (antinuclear antibody)   • Post herpetic neuralgia   • Retinal migraine   • Cervical polyp   • Abnormal uterine bleeding (AUB)       Social History     Socioeconomic History   • Marital status:      Spouse name: Not on file   • Number of children: Not on file   • Years of education: Not on file   • Highest education level: Not on file   Tobacco Use   • Smoking status: Former Smoker     Packs/day: 0.25     Years: 15.00     Pack years: 3.75     Types: Cigarettes     Last attempt to quit: 1999     Years since quittin.2   • Smokeless tobacco: Never Used   Substance and Sexual Activity   • Drug use: No   • Sexual activity: Defer       Current Outpatient Medications on File Prior to Visit   Medication Sig Dispense Refill   • Multiple Vitamins-Minerals (MULTIVITAMIN ADULT) tablet Take 1 tablet by mouth Daily.       No current facility-administered medications on file prior to visit.        Allergies   Allergen Reactions   • Tramadol Mental Status Change       /70   Pulse 61   Temp 98 °F (36.7 °C)   Ht 170.2 cm (67\")   Wt 77.9 kg (171 lb 12.8 oz)   SpO2 98% Comment: ra  BMI 26.91 kg/m²            The following portions of the patient's history were reviewed and updated as appropriate: allergies, current medications, past family history, past medical history, past social history, past surgical history and problem list.    Physical Exam   Constitutional: She is oriented to person, place, and time. She appears well-developed and well-nourished.   HENT:   Head: Normocephalic and atraumatic.   Right Ear: External ear normal.   Left Ear: External ear normal.   Mouth/Throat: No oropharyngeal exudate.   Eyes: Pupils are equal, round, and reactive to light. Conjunctivae are " normal.   Neck: Neck supple. No thyromegaly present.   Cardiovascular: Normal rate, regular rhythm and intact distal pulses.   Pulmonary/Chest: Effort normal and breath sounds normal.   Abdominal: Soft. Bowel sounds are normal. She exhibits no distension. There is no tenderness.   Musculoskeletal: She exhibits no edema.   Neurological: She is alert and oriented to person, place, and time. No cranial nerve deficit.   Skin: Skin is warm and dry.   Psychiatric: She has a normal mood and affect. Judgment normal.   Nursing note and vitals reviewed.      Results for orders placed or performed in visit on 08/17/20   POCT urinalysis dipstick, automated   Result Value Ref Range    Color Yellow Yellow, Straw, Dark Yellow, Dana    Clarity, UA Clear Clear    Specific Gravity  1.015 1.005 - 1.030    pH, Urine 6.0 5.0 - 8.0    Leukocytes Negative Negative    Nitrite, UA Negative Negative    Protein, POC Negative Negative mg/dL    Glucose, UA Negative Negative, 1000 mg/dL (3+) mg/dL    Ketones, UA Negative Negative    Urobilinogen, UA Normal Normal    Bilirubin Negative Negative    Blood, UA 1+ (A) Negative       Delma was seen today for annual exam, stress and insomnia.    Diagnoses and all orders for this visit:    Routine general medical examination at a health care facility  -     POCT urinalysis dipstick, automated  -     CBC (No Diff); Future  -     Comprehensive Metabolic Panel; Future  -     Lipid Panel; Future  -     TSH; Future  -     Vitamin D 25 Hydroxy; Future  -     Hepatitis C Antibody; Future  -     Shingrix Vaccine    Other insomnia  -     traZODone (DESYREL) 50 MG tablet; 1-2 tabs PO at HS for insomnia    Hematuria, unspecified type  -     Urine Culture - Urine, Urine, Clean Catch; Future  -     amoxicillin (AMOXIL) 875 MG tablet; Take 1 tablet by mouth 2 (Two) Times a Day. For infection          Health Maintenance   Topic Date Due   • INFLUENZA VACCINE  08/01/2020   • ZOSTER VACCINE (2 of 2) 10/12/2020   •  MAMMOGRAM  03/27/2021   • ANNUAL PHYSICAL  08/18/2021   • COLONOSCOPY  09/30/2021   • PAP SMEAR  03/02/2023   • TDAP/TD VACCINES (2 - Td) 11/13/2028   • HEPATITIS C SCREENING  Completed       Discussion/Summary  Impression: health maintenance visit. Currently, she eats an adequate diet and has an adequate exercise regimen.   Cervical cancer screening:Pap smear is current.   Breast cancer screening: mammogram is current.   Colorectal cancer screening: colonoscopy is current.  Osteoporosis screening: Bone mineral density test is not indicated.   Screening lab work includes hemoglobin, glucose, lipid profile, thyroid function testing, 25-hydroxyvitamin D and urinalysis.   Immunizations are needed, immunizations will be given as outlined in the orders   Advice and education were given regarding cardiovascular risk reduction, healthy dietary habits, Seatbelt and helmet use and self skin examination.     Return in about 1 year (around 8/17/2021) for Annual.

## 2020-08-18 LAB
BACTERIA SPEC AEROBE CULT: ABNORMAL
DEPRECATED RDW RBC AUTO: 38.9 FL (ref 37–54)
ERYTHROCYTE [DISTWIDTH] IN BLOOD BY AUTOMATED COUNT: 11.4 % (ref 12.3–15.4)
HCT VFR BLD AUTO: 40.2 % (ref 34–46.6)
HGB BLD-MCNC: 13.4 G/DL (ref 12–15.9)
MCH RBC QN AUTO: 31 PG (ref 26.6–33)
MCHC RBC AUTO-ENTMCNC: 33.3 G/DL (ref 31.5–35.7)
MCV RBC AUTO: 93.1 FL (ref 79–97)
PLATELET # BLD AUTO: 243 10*3/MM3 (ref 140–450)
PMV BLD AUTO: 11.2 FL (ref 6–12)
RBC # BLD AUTO: 4.32 10*6/MM3 (ref 3.77–5.28)
WBC # BLD AUTO: 6.03 10*3/MM3 (ref 3.4–10.8)

## 2020-08-21 RX ORDER — AMOXICILLIN 875 MG/1
875 TABLET, COATED ORAL 2 TIMES DAILY
Qty: 10 TABLET | Refills: 0 | Status: SHIPPED | OUTPATIENT
Start: 2020-08-21 | End: 2021-08-17

## 2021-08-02 ENCOUNTER — TELEPHONE (OUTPATIENT)
Dept: INTERNAL MEDICINE | Facility: CLINIC | Age: 56
End: 2021-08-02

## 2021-08-02 NOTE — TELEPHONE ENCOUNTER
PATIENT HAVING INTOLERABLE HOT FLASHES AND WOULD LIKE LABS DRAWN PRIOR TO APPOINTMENT.  NEEDS ORDERS PLEASE.      PLEASE CALL [phone]

## 2021-08-05 ENCOUNTER — LAB (OUTPATIENT)
Dept: LAB | Facility: HOSPITAL | Age: 56
End: 2021-08-05

## 2021-08-05 DIAGNOSIS — R23.2 HOT FLASHES: ICD-10-CM

## 2021-08-05 LAB
ANION GAP SERPL CALCULATED.3IONS-SCNC: 15.5 MMOL/L (ref 5–15)
BUN SERPL-MCNC: 19 MG/DL (ref 6–20)
BUN/CREAT SERPL: 48.7 (ref 7–25)
CALCIUM SPEC-SCNC: 10 MG/DL (ref 8.6–10.5)
CHLORIDE SERPL-SCNC: 99 MMOL/L (ref 98–107)
CO2 SERPL-SCNC: 25.5 MMOL/L (ref 22–29)
CREAT SERPL-MCNC: 0.39 MG/DL (ref 0.57–1)
DEPRECATED RDW RBC AUTO: 40.2 FL (ref 37–54)
ERYTHROCYTE [DISTWIDTH] IN BLOOD BY AUTOMATED COUNT: 12.4 % (ref 12.3–15.4)
ESTRADIOL SERPL HS-MCNC: 59.9 PG/ML
FSH SERPL-ACNC: 36.4 MIU/ML
GFR SERPL CREATININE-BSD FRML MDRD: >150 ML/MIN/1.73
GLUCOSE SERPL-MCNC: 69 MG/DL (ref 65–99)
HCT VFR BLD AUTO: 43.2 % (ref 34–46.6)
HGB BLD-MCNC: 15.1 G/DL (ref 12–15.9)
LH SERPL-ACNC: 38.4 MIU/ML
MCH RBC QN AUTO: 31.6 PG (ref 26.6–33)
MCHC RBC AUTO-ENTMCNC: 35 G/DL (ref 31.5–35.7)
MCV RBC AUTO: 90.4 FL (ref 79–97)
PLATELET # BLD AUTO: 249 10*3/MM3 (ref 140–450)
PMV BLD AUTO: 10.8 FL (ref 6–12)
POTASSIUM SERPL-SCNC: 4 MMOL/L (ref 3.5–5.2)
PROGEST SERPL-MCNC: 0.25 NG/ML
RBC # BLD AUTO: 4.78 10*6/MM3 (ref 3.77–5.28)
SODIUM SERPL-SCNC: 140 MMOL/L (ref 136–145)
TSH SERPL DL<=0.05 MIU/L-ACNC: 2.08 UIU/ML (ref 0.27–4.2)
WBC # BLD AUTO: 8.94 10*3/MM3 (ref 3.4–10.8)

## 2021-08-05 PROCEDURE — 83001 ASSAY OF GONADOTROPIN (FSH): CPT

## 2021-08-05 PROCEDURE — 84144 ASSAY OF PROGESTERONE: CPT

## 2021-08-05 PROCEDURE — 80048 BASIC METABOLIC PNL TOTAL CA: CPT

## 2021-08-05 PROCEDURE — 85027 COMPLETE CBC AUTOMATED: CPT

## 2021-08-05 PROCEDURE — 83002 ASSAY OF GONADOTROPIN (LH): CPT

## 2021-08-05 PROCEDURE — 82670 ASSAY OF TOTAL ESTRADIOL: CPT

## 2021-08-05 PROCEDURE — 84443 ASSAY THYROID STIM HORMONE: CPT

## 2021-08-09 ENCOUNTER — TELEPHONE (OUTPATIENT)
Dept: INTERNAL MEDICINE | Facility: CLINIC | Age: 56
End: 2021-08-09

## 2021-08-09 NOTE — TELEPHONE ENCOUNTER
Caller: Delma Mccullough    Relationship: Self    Best call back number: [phone]    What form or medical record are you requesting: LAB WORK    Who is requesting this form or medical record from you: SELF    How would you like to receive the form or medical records (pick-up, mail, fax):  If mail, what is the address:   [address] [place] [phone]    Timeframe paperwork needed: ANYTIME    Additional notes:

## 2021-08-17 ENCOUNTER — OFFICE VISIT (OUTPATIENT)
Dept: INTERNAL MEDICINE | Facility: CLINIC | Age: 56
End: 2021-08-17

## 2021-08-17 VITALS
WEIGHT: 168 LBS | HEIGHT: 67 IN | HEART RATE: 57 BPM | BODY MASS INDEX: 26.37 KG/M2 | OXYGEN SATURATION: 97 % | SYSTOLIC BLOOD PRESSURE: 120 MMHG | DIASTOLIC BLOOD PRESSURE: 84 MMHG

## 2021-08-17 DIAGNOSIS — N95.1 PERIMENOPAUSAL: ICD-10-CM

## 2021-08-17 DIAGNOSIS — R23.2 HOT FLASHES: Primary | ICD-10-CM

## 2021-08-17 DIAGNOSIS — G47.09 OTHER INSOMNIA: ICD-10-CM

## 2021-08-17 PROCEDURE — 99214 OFFICE O/P EST MOD 30 MIN: CPT | Performed by: INTERNAL MEDICINE

## 2021-08-17 RX ORDER — TRAZODONE HYDROCHLORIDE 50 MG/1
TABLET ORAL
Qty: 60 TABLET | Refills: 5
Start: 2021-08-17 | End: 2021-12-20 | Stop reason: SDUPTHER

## 2021-08-17 RX ORDER — CHLORAL HYDRATE 500 MG
CAPSULE ORAL
COMMUNITY

## 2021-08-17 RX ORDER — VENLAFAXINE HYDROCHLORIDE 37.5 MG/1
37.5 CAPSULE, EXTENDED RELEASE ORAL DAILY
Qty: 7 CAPSULE | Refills: 0 | Status: SHIPPED | OUTPATIENT
Start: 2021-08-17 | End: 2021-12-14

## 2021-08-17 RX ORDER — VENLAFAXINE HYDROCHLORIDE 75 MG/1
75 CAPSULE, EXTENDED RELEASE ORAL DAILY
Qty: 30 CAPSULE | Refills: 5 | Status: SHIPPED | OUTPATIENT
Start: 2021-08-17 | End: 2021-12-14

## 2021-08-17 NOTE — PROGRESS NOTES
Follow-up and lab results    Subjective   Delma Mccullough is a 55 y.o. female is here today for follow-up.    History of Present Illness   Hot flashes are out of control, has had it for appx a year.. Emotions are out of control.  S/p labs. No night sweats or sleep issues. Last period was 10 months ago.  Dad had bladder ca, and mom had uterine ca.      Current Outpatient Medications:   •  Multiple Vitamins-Minerals (MULTIVITAMIN ADULT) tablet, Take 1 tablet by mouth Daily., Disp: , Rfl:   •  Omega-3 Fatty Acids (fish oil) 1000 MG capsule capsule, Take  by mouth Daily With Breakfast., Disp: , Rfl:   •  VITAMIN C, CALCIUM ASCORBATE, PO, Take  by mouth., Disp: , Rfl:   •  VITAMIN D, CHOLECALCIFEROL, PO, Take  by mouth., Disp: , Rfl:   •  traZODone (DESYREL) 50 MG tablet, 1-2 tabs PO at HS for insomnia, Disp: 60 tablet, Rfl: 5  •  venlafaxine XR (Effexor XR) 37.5 MG 24 hr capsule, Take 1 capsule by mouth Daily for 7 days., Disp: 7 capsule, Rfl: 0  •  venlafaxine XR (Effexor XR) 75 MG 24 hr capsule, Take 1 capsule by mouth Daily. Start after 37.5 mg complete, Disp: 30 capsule, Rfl: 5      The following portions of the patient's history were reviewed and updated as appropriate: allergies, current medications, past family history, past medical history, past social history, past surgical history and problem list.    Review of Systems   Constitutional: Negative.  Negative for chills and fever.   HENT: Negative for ear discharge, ear pain, sinus pressure and sore throat.    Respiratory: Negative for cough, chest tightness and shortness of breath.    Cardiovascular: Negative for chest pain, palpitations and leg swelling.   Gastrointestinal: Negative for diarrhea, nausea and vomiting.   Endocrine: Positive for heat intolerance.   Musculoskeletal: Negative for arthralgias, back pain and myalgias.   Neurological: Negative for dizziness, syncope and headaches.   Psychiatric/Behavioral: Negative for confusion and sleep disturbance.     "    Mood swings       Objective   /84   Pulse 57   Ht 170.2 cm (67\")   Wt 76.2 kg (168 lb)   LMP  (LMP Unknown)   SpO2 97% Comment: ra  BMI 26.31 kg/m²   Physical Exam  Vitals and nursing note reviewed.   Constitutional:       Appearance: She is well-developed.   HENT:      Head: Normocephalic and atraumatic.      Right Ear: External ear normal.      Left Ear: External ear normal.      Mouth/Throat:      Pharynx: No oropharyngeal exudate.   Eyes:      Conjunctiva/sclera: Conjunctivae normal.      Pupils: Pupils are equal, round, and reactive to light.   Neck:      Thyroid: No thyromegaly.   Cardiovascular:      Rate and Rhythm: Normal rate and regular rhythm.      Pulses: Normal pulses.      Heart sounds: Normal heart sounds. No murmur heard.   No friction rub. No gallop.    Pulmonary:      Effort: Pulmonary effort is normal.      Breath sounds: Normal breath sounds.   Abdominal:      General: Bowel sounds are normal. There is no distension.      Palpations: Abdomen is soft.      Tenderness: There is no abdominal tenderness.   Musculoskeletal:      Cervical back: Neck supple.   Skin:     General: Skin is warm and dry.   Neurological:      Mental Status: She is alert and oriented to person, place, and time.      Cranial Nerves: No cranial nerve deficit.   Psychiatric:         Judgment: Judgment normal.           Results for orders placed or performed in visit on 08/05/21   Estradiol    Specimen: Blood   Result Value Ref Range    Estradiol 59.9 pg/mL   Follicle Stimulating Hormone    Specimen: Blood   Result Value Ref Range    FSH 36.40 mIU/mL   Luteinizing Hormone    Specimen: Blood   Result Value Ref Range    LH 38.40 mIU/mL   Progesterone    Specimen: Blood   Result Value Ref Range    Progesterone 0.25 ng/mL   TSH    Specimen: Blood   Result Value Ref Range    TSH 2.080 0.270 - 4.200 uIU/mL   CBC (No Diff)    Specimen: Blood   Result Value Ref Range    WBC 8.94 3.40 - 10.80 10*3/mm3    RBC 4.78 3.77 - " 5.28 10*6/mm3    Hemoglobin 15.1 12.0 - 15.9 g/dL    Hematocrit 43.2 34.0 - 46.6 %    MCV 90.4 79.0 - 97.0 fL    MCH 31.6 26.6 - 33.0 pg    MCHC 35.0 31.5 - 35.7 g/dL    RDW 12.4 12.3 - 15.4 %    RDW-SD 40.2 37.0 - 54.0 fl    MPV 10.8 6.0 - 12.0 fL    Platelets 249 140 - 450 10*3/mm3   Basic Metabolic Panel    Specimen: Blood   Result Value Ref Range    Glucose 69 65 - 99 mg/dL    BUN 19 6 - 20 mg/dL    Creatinine 0.39 (L) 0.57 - 1.00 mg/dL    Sodium 140 136 - 145 mmol/L    Potassium 4.0 3.5 - 5.2 mmol/L    Chloride 99 98 - 107 mmol/L    CO2 25.5 22.0 - 29.0 mmol/L    Calcium 10.0 8.6 - 10.5 mg/dL    eGFR Non African Amer >150 >60 mL/min/1.73    BUN/Creatinine Ratio 48.7 (H) 7.0 - 25.0    Anion Gap 15.5 (H) 5.0 - 15.0 mmol/L             Assessment/Plan   Diagnoses and all orders for this visit:    Hot flashes  -     Estradiol; Future  -     Follicle Stimulating Hormone; Future  -     Luteinizing Hormone; Future  -     Progesterone; Future  -     TSH; Future  -     CBC (No Diff); Future  -     Basic Metabolic Panel; Future  -     venlafaxine XR (Effexor XR) 37.5 MG 24 hr capsule; Take 1 capsule by mouth Daily for 7 days.  -     venlafaxine XR (Effexor XR) 75 MG 24 hr capsule; Take 1 capsule by mouth Daily. Start after 37.5 mg complete    Perimenopausal  -     venlafaxine XR (Effexor XR) 37.5 MG 24 hr capsule; Take 1 capsule by mouth Daily for 7 days.  -     venlafaxine XR (Effexor XR) 75 MG 24 hr capsule; Take 1 capsule by mouth Daily. Start after 37.5 mg complete    Other insomnia  -     traZODone (DESYREL) 50 MG tablet; 1-2 tabs PO at HS for insomnia    Other orders  -     Omega-3 Fatty Acids (fish oil) 1000 MG capsule capsule; Take  by mouth Daily With Breakfast.  -     VITAMIN D, CHOLECALCIFEROL, PO; Take  by mouth.  -     VITAMIN C, CALCIUM ASCORBATE, PO; Take  by mouth.    hold off on hrt due to mom's uterine ca.    If not better, can try low dose bioidenticals, and can add testosterone to it.             No  follow-ups on file.    Electronically signed by:    Joana Hunter MD

## 2021-08-23 DIAGNOSIS — R23.2 HOT FLASHES: ICD-10-CM

## 2021-08-23 DIAGNOSIS — N95.1 PERIMENOPAUSAL: ICD-10-CM

## 2021-08-23 RX ORDER — VENLAFAXINE HYDROCHLORIDE 37.5 MG/1
37.5 CAPSULE, EXTENDED RELEASE ORAL DAILY
Qty: 7 CAPSULE | Refills: 0 | OUTPATIENT
Start: 2021-08-23 | End: 2021-08-30

## 2021-12-14 ENCOUNTER — OFFICE VISIT (OUTPATIENT)
Dept: INTERNAL MEDICINE | Facility: CLINIC | Age: 56
End: 2021-12-14

## 2021-12-14 ENCOUNTER — LAB (OUTPATIENT)
Dept: LAB | Facility: HOSPITAL | Age: 56
End: 2021-12-14

## 2021-12-14 VITALS
HEIGHT: 67 IN | HEART RATE: 57 BPM | WEIGHT: 164 LBS | BODY MASS INDEX: 25.74 KG/M2 | TEMPERATURE: 98.1 F | DIASTOLIC BLOOD PRESSURE: 68 MMHG | OXYGEN SATURATION: 98 % | SYSTOLIC BLOOD PRESSURE: 100 MMHG

## 2021-12-14 DIAGNOSIS — R51.9 LEFT TEMPORAL HEADACHE: ICD-10-CM

## 2021-12-14 DIAGNOSIS — N95.0 POST-MENOPAUSAL BLEEDING: ICD-10-CM

## 2021-12-14 DIAGNOSIS — H54.3 VISION LOSS, BILATERAL: ICD-10-CM

## 2021-12-14 DIAGNOSIS — R51.9 LEFT TEMPORAL HEADACHE: Primary | ICD-10-CM

## 2021-12-14 LAB
BASOPHILS # BLD AUTO: 0.04 10*3/MM3 (ref 0–0.2)
BASOPHILS NFR BLD AUTO: 0.5 % (ref 0–1.5)
DEPRECATED RDW RBC AUTO: 40 FL (ref 37–54)
EOSINOPHIL # BLD AUTO: 0.04 10*3/MM3 (ref 0–0.4)
EOSINOPHIL NFR BLD AUTO: 0.5 % (ref 0.3–6.2)
ERYTHROCYTE [DISTWIDTH] IN BLOOD BY AUTOMATED COUNT: 11.7 % (ref 12.3–15.4)
ERYTHROCYTE [SEDIMENTATION RATE] IN BLOOD: 4 MM/HR (ref 0–30)
HCT VFR BLD AUTO: 41.7 % (ref 34–46.6)
HGB BLD-MCNC: 14.3 G/DL (ref 12–15.9)
IMM GRANULOCYTES # BLD AUTO: 0.02 10*3/MM3 (ref 0–0.05)
IMM GRANULOCYTES NFR BLD AUTO: 0.3 % (ref 0–0.5)
LYMPHOCYTES # BLD AUTO: 2.05 10*3/MM3 (ref 0.7–3.1)
LYMPHOCYTES NFR BLD AUTO: 27.2 % (ref 19.6–45.3)
MCH RBC QN AUTO: 31.8 PG (ref 26.6–33)
MCHC RBC AUTO-ENTMCNC: 34.3 G/DL (ref 31.5–35.7)
MCV RBC AUTO: 92.9 FL (ref 79–97)
MONOCYTES # BLD AUTO: 0.42 10*3/MM3 (ref 0.1–0.9)
MONOCYTES NFR BLD AUTO: 5.6 % (ref 5–12)
NEUTROPHILS NFR BLD AUTO: 4.96 10*3/MM3 (ref 1.7–7)
NEUTROPHILS NFR BLD AUTO: 65.9 % (ref 42.7–76)
NRBC BLD AUTO-RTO: 0 /100 WBC (ref 0–0.2)
PLATELET # BLD AUTO: 250 10*3/MM3 (ref 140–450)
PMV BLD AUTO: 10.9 FL (ref 6–12)
RBC # BLD AUTO: 4.49 10*6/MM3 (ref 3.77–5.28)
WBC NRBC COR # BLD: 7.53 10*3/MM3 (ref 3.4–10.8)

## 2021-12-14 PROCEDURE — 85652 RBC SED RATE AUTOMATED: CPT

## 2021-12-14 PROCEDURE — 85025 COMPLETE CBC W/AUTO DIFF WBC: CPT

## 2021-12-14 PROCEDURE — 36415 COLL VENOUS BLD VENIPUNCTURE: CPT

## 2021-12-14 PROCEDURE — 99214 OFFICE O/P EST MOD 30 MIN: CPT

## 2021-12-14 PROCEDURE — 86140 C-REACTIVE PROTEIN: CPT

## 2021-12-14 NOTE — PROGRESS NOTES
Chief Complaint  Headache (Pt c/o headache x week, vision issues as well with headache)    Delma Mccullough presents to [facility]    HPI: Headache localized to the left temporal region x 7 days. Described as achy and dull. No nausea or light sensitivity. States that she will intermittently experience monocular vision loss of the ipsilateral eye lasting for 5-10 mins. States this has also occurred on the contralateral side as well. States that the visual symptoms are not new and has been occurring the past few years. Reports that headache has woken her up in the middle of the night at times over the past week. Denies increase in headache intensity with position changes but states she hasn't really paid any mind to this. Has a hx of retinal migraines and Followed with Dr. Henderson with neurology in the past but states this feels different and she has not had one of these in quiet awhile. Has tried taking acetaminophen and ibuprofen with no improvement in symptoms. Reports that the headache will sometimes radiate to her left jaw. Had a positive PATRICK in the past. Pain is currently rated as a 4 out of 10.     HPI: Reports being menopausal for over 1 year. States she experienced a menstrual cycle before the onset of this headache. Follows with Dr. Bahena with OBGYN.     Subjective       [facility]  The following portions of the patient's history were reviewed and updated as appropriate: allergies, current medications, past family history, past medical history, past social history, past surgical history and problem list.     Past Medical History:   Diagnosis Date   • Constipation       Allergies   Allergen Reactions   • Tramadol Mental Status Change      Social History     Tobacco Use   • Smoking status: Former Smoker     Packs/day: 0.25     Years: 15.00     Pack years: 3.75     Types: Cigarettes     Quit date: 1999     Years since quittin.5   • Smokeless tobacco: Never Used   Substance  Use Topics   • Alcohol use: Not on file     Comment: socially   • Drug use: No     Past Surgical History:   Procedure Laterality Date   • D & C HYSTEROSCOPY  07/2014   • DIAGNOSTIC LAPAROSCOPY  1985   • SHOULDER SURGERY     • SHOULDER SURGERY Left       Family History   Problem Relation Age of Onset   • Diabetes Mother    • Stroke Mother    • Colon cancer Father    • Kidney cancer Brother    • Dementia Other    • Breast cancer Neg Hx    • Ovarian cancer Neg Hx          Current Outpatient Medications:   •  Multiple Vitamins-Minerals (MULTIVITAMIN ADULT) tablet, Take 1 tablet by mouth Daily., Disp: , Rfl:   •  Omega-3 Fatty Acids (fish oil) 1000 MG capsule capsule, Take  by mouth Daily With Breakfast., Disp: , Rfl:   •  traZODone (DESYREL) 50 MG tablet, 1-2 tabs PO at HS for insomnia, Disp: 60 tablet, Rfl: 5  •  VITAMIN C, CALCIUM ASCORBATE, PO, Take  by mouth., Disp: , Rfl:   •  VITAMIN D, CHOLECALCIFEROL, PO, Take  by mouth., Disp: , Rfl:     Review of Systems   Constitutional: Negative for activity change, appetite change, fatigue and fever.   Eyes: Positive for blurred vision and visual disturbance. Negative for double vision, photophobia, pain, discharge, redness and itching.   Respiratory: Negative for apnea, cough, choking, chest tightness, shortness of breath, wheezing and stridor.    Cardiovascular: Negative for chest pain, palpitations and leg swelling.   Gastrointestinal: Negative for abdominal distention, abdominal pain, anal bleeding, blood in stool, constipation, diarrhea, nausea, rectal pain, vomiting, GERD and indigestion.   Endocrine: Negative for cold intolerance and heat intolerance.   Skin: Negative for color change, dry skin, pallor, rash, skin lesions and bruise.   Neurological: Positive for headache. Negative for dizziness, tremors, seizures, syncope, facial asymmetry, speech difficulty, weakness, light-headedness, numbness, memory problem and confusion.       Objective   Vital Signs  /68   " Pulse 57   Temp 98.1 °F (36.7 °C)   Ht 170.2 cm (67\")   Wt 74.4 kg (164 lb)   SpO2 98%   BMI 25.69 kg/m²     Physical Exam  Constitutional:       Appearance: Normal appearance.   HENT:      Head: Normocephalic.      Nose: Nose normal.      Mouth/Throat:      Mouth: Mucous membranes are moist.      Pharynx: Oropharynx is clear.   Eyes:      Conjunctiva/sclera: Conjunctivae normal.      Pupils: Pupils are equal, round, and reactive to light.   Cardiovascular:      Rate and Rhythm: Normal rate and regular rhythm.      Pulses: Normal pulses.      Heart sounds: Normal heart sounds.   Pulmonary:      Effort: Pulmonary effort is normal.      Breath sounds: Normal breath sounds.   Abdominal:      General: Bowel sounds are normal.      Palpations: Abdomen is soft.   Skin:     General: Skin is warm and dry.      Capillary Refill: Capillary refill takes less than 2 seconds.   Neurological:      General: No focal deficit present.      Mental Status: She is alert and oriented to person, place, and time.      Cranial Nerves: Cranial nerves are intact.      Sensory: Sensation is intact.      Motor: Motor function is intact.      Coordination: Coordination is intact.      Gait: Gait is intact.      Deep Tendon Reflexes: Reflexes are normal and symmetric.   Psychiatric:         Mood and Affect: Mood normal.         Behavior: Behavior normal.         Thought Content: Thought content normal.         Judgment: Judgment normal.          Result Review :     The following data was reviewed by: MARGARITA Snow on 12/14/2021:  CMP    CMP 8/5/21   Glucose 69   BUN 19   Creatinine 0.39 (A)   eGFR Non African Am >150   Sodium 140   Potassium 4.0   Chloride 99   Calcium 10.0   (A) Abnormal value              Assessment and Plan    1. Left temporal headache  - C-reactive Protein; Future  - Sedimentation rate, automated; Future  - CBC & Differential; Future  - MRI Brain Without Contrast; Future  - Ambulatory Referral to Neurology  - With " current symptoms would be suspicious for GCA however patient states the intermittent vision loss has been going on for sometime and ESR and CRP were non-elevated. Will refer to neurology r/t onset of new and changed headache in a patient over 50 years old and headache pain that disturbs sleep. Will order MRI in anticipation of referral.     2. Vision loss, bilateral  - MRI Brain Without Contrast; Future  - Ambulatory Referral to Neurology    3. Post-menopausal bleeding.   - Discussed need for patient to follow-up with OBGYN immediately for further evaluation and management.       Follow Up     Return for Next scheduled follow up.    Patient was given instructions and counseling regarding her condition or for health maintenance advice. Please see specific information pulled into the AVS if appropriate.    Part of this note may be an electronic transcription/translation of spoken language to printed text using the Dragon Dictation System.    Electronically signed by:   MARGARITA Snow  12/14/2021

## 2021-12-15 LAB — CRP SERPL-MCNC: <0.3 MG/DL (ref 0–0.5)

## 2021-12-16 ENCOUNTER — TELEPHONE (OUTPATIENT)
Dept: INTERNAL MEDICINE | Facility: CLINIC | Age: 56
End: 2021-12-16

## 2021-12-16 NOTE — TELEPHONE ENCOUNTER
Caller: Delma Mccullough    Relationship: Self    Best call back number: [phone]    Caller requesting test results: YES     What test was performed: LABS    When was the test performed: 12/14/2021     Where was the test performed: IN OFFICE     Additional notes: PATIENT CALLED ILEANA HINES TO CALL HER REGARDING HER LAB RESULTS AS WELL AS HER MRI APPOINTMENT. SCHEDULED DEPARTMENT CALLED HER AND DID NOT HAVE AVAILABLY UNTIL January 21 2022 AND PATIENT THOUGHT IT WAS BE STAT AND SHE WOULD GET IT DONE SOONER, PLEASE ADVISE AND CALL PATIENT

## 2021-12-20 ENCOUNTER — APPOINTMENT (OUTPATIENT)
Dept: MRI IMAGING | Facility: HOSPITAL | Age: 56
End: 2021-12-20

## 2021-12-20 ENCOUNTER — OFFICE VISIT (OUTPATIENT)
Dept: INTERNAL MEDICINE | Facility: CLINIC | Age: 56
End: 2021-12-20

## 2021-12-20 ENCOUNTER — LAB (OUTPATIENT)
Dept: LAB | Facility: HOSPITAL | Age: 56
End: 2021-12-20

## 2021-12-20 VITALS
HEIGHT: 67 IN | HEART RATE: 64 BPM | OXYGEN SATURATION: 97 % | DIASTOLIC BLOOD PRESSURE: 64 MMHG | WEIGHT: 170.4 LBS | SYSTOLIC BLOOD PRESSURE: 108 MMHG | BODY MASS INDEX: 26.74 KG/M2

## 2021-12-20 DIAGNOSIS — Z00.00 ROUTINE GENERAL MEDICAL EXAMINATION AT A HEALTH CARE FACILITY: Primary | ICD-10-CM

## 2021-12-20 DIAGNOSIS — R31.9 HEMATURIA, UNSPECIFIED TYPE: ICD-10-CM

## 2021-12-20 DIAGNOSIS — G47.09 OTHER INSOMNIA: ICD-10-CM

## 2021-12-20 DIAGNOSIS — Z12.11 SCREENING FOR COLON CANCER: ICD-10-CM

## 2021-12-20 LAB
BILIRUB BLD-MCNC: NEGATIVE MG/DL
CLARITY, POC: CLEAR
COLOR UR: YELLOW
EXPIRATION DATE: ABNORMAL
GLUCOSE UR STRIP-MCNC: NEGATIVE MG/DL
KETONES UR QL: NEGATIVE
LEUKOCYTE EST, POC: NEGATIVE
Lab: ABNORMAL
NITRITE UR-MCNC: NEGATIVE MG/ML
PH UR: 6 [PH] (ref 5–8)
PROT UR STRIP-MCNC: NEGATIVE MG/DL
RBC # UR STRIP: ABNORMAL /UL
SP GR UR: 1.01 (ref 1–1.03)
UROBILINOGEN UR QL: NORMAL

## 2021-12-20 PROCEDURE — 99396 PREV VISIT EST AGE 40-64: CPT | Performed by: INTERNAL MEDICINE

## 2021-12-20 PROCEDURE — 81003 URINALYSIS AUTO W/O SCOPE: CPT | Performed by: INTERNAL MEDICINE

## 2021-12-20 PROCEDURE — 87086 URINE CULTURE/COLONY COUNT: CPT

## 2021-12-20 RX ORDER — TRAZODONE HYDROCHLORIDE 50 MG/1
TABLET ORAL
Qty: 60 TABLET | Refills: 5 | Status: SHIPPED | OUTPATIENT
Start: 2021-12-20

## 2021-12-20 NOTE — PROGRESS NOTES
Chief Complaint   Patient presents with   • Annual Exam   • Headache     has MRI sched Monday       History of Present Illness  HM, Adult Female:The patient is being seen for a health maintenance evaluation. The last health maintenance visit was 1 year(s) ago. and gynecology - Dr. Campos.  Social History: She is  with 1 child/son Gopi- 10 yo. 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  Planning to move to [place]  She is a prior smoker. She reports socially drinking alcohol. Denies any illicit drug use.  General Health: The patient's health is described as good. She has regular dental visits, s/p recent root canal . She denies vision problems. She denies hearing loss. Immunizations status: up to date.   Lifestyle:. She consumes a diverse and healthy diet. She does not have any weight concerns. She exercises regularly. She denies tobacco. She denies alcohol use. She denies drug use.   Reproductive health:. She reports irregular periods, had one after almost a yeart Perimenopausal.  Screening: Cancer screening reviewed and current.   Metabolic screening reviewed and current.   Risk screening reviewed and current.   .       Has been having a HA x 2-3 weeks.s/p labs.   MRI is rescheduled for Monday. H/o ocular migraine, and has seen Dr. Virk.      Review of Systems   Constitutional: Negative.  Negative for chills, fatigue and fever.   HENT: Negative for congestion, ear discharge, ear pain, sinus pressure and sore throat.    Eyes: Negative for pain, redness and visual disturbance.   Respiratory: Negative for cough, chest tightness and shortness of breath.    Cardiovascular: Negative for chest pain, palpitations and leg swelling.   Gastrointestinal: Negative for abdominal pain, diarrhea, nausea and vomiting.   Endocrine: Negative for cold intolerance and heat intolerance.   Genitourinary: Negative for flank pain and urgency.   Musculoskeletal: Negative for arthralgias, back pain, gait  "problem and myalgias.   Skin: Negative for pallor and rash.   Neurological: Negative for dizziness, syncope, weakness and headaches.   Psychiatric/Behavioral: Negative for confusion, dysphoric mood and sleep disturbance. The patient is not nervous/anxious.        Patient Active Problem List   Diagnosis   • Left flank pain   • Positive PATRICK (antinuclear antibody)   • Post herpetic neuralgia   • Retinal migraine   • Cervical polyp   • Abnormal uterine bleeding (AUB)       Social History     Socioeconomic History   • Marital status:    Tobacco Use   • Smoking status: Former Smoker     Packs/day: 0.25     Years: 15.00     Pack years: 3.75     Types: Cigarettes     Quit date: 1999     Years since quittin.5   • Smokeless tobacco: Never Used   Substance and Sexual Activity   • Drug use: No   • Sexual activity: Defer       Current Outpatient Medications on File Prior to Visit   Medication Sig Dispense Refill   • Multiple Vitamins-Minerals (MULTIVITAMIN ADULT) tablet Take 1 tablet by mouth Daily.     • Omega-3 Fatty Acids (fish oil) 1000 MG capsule capsule Take  by mouth Daily With Breakfast.     • VITAMIN C, CALCIUM ASCORBATE, PO Take  by mouth.     • VITAMIN D, CHOLECALCIFEROL, PO Take  by mouth.       No current facility-administered medications on file prior to visit.       Allergies   Allergen Reactions   • Tramadol Mental Status Change       /64   Pulse 64   Ht 170.2 cm (67\")   Wt 77.3 kg (170 lb 6.4 oz)   SpO2 97% Comment: ra  BMI 26.69 kg/m²            The following portions of the patient's history were reviewed and updated as appropriate: allergies, current medications, past family history, past medical history, past social history, past surgical history and problem list.    Physical Exam  Constitutional:       General: She is not in acute distress.     Appearance: Normal appearance.   HENT:      Head: Normocephalic and atraumatic.      Right Ear: Tympanic membrane and external ear normal. "      Left Ear: Tympanic membrane and external ear normal.      Nose: Nose normal.      Mouth/Throat:      Mouth: Mucous membranes are moist.   Eyes:      General: No scleral icterus.  Neck:      Vascular: No carotid bruit.   Cardiovascular:      Rate and Rhythm: Normal rate and regular rhythm.      Pulses: Normal pulses.      Heart sounds: Normal heart sounds. No murmur heard.  No friction rub. No gallop.    Pulmonary:      Effort: Pulmonary effort is normal.      Breath sounds: Normal breath sounds. No rhonchi or rales.   Abdominal:      General: Bowel sounds are normal. There is no distension.      Palpations: Abdomen is soft.      Tenderness: There is no right CVA tenderness, left CVA tenderness, guarding or rebound.      Hernia: No hernia is present.   Musculoskeletal:         General: No tenderness. Normal range of motion.      Cervical back: Normal range of motion.      Right lower leg: No edema.      Left lower leg: No edema.   Lymphadenopathy:      Cervical: No cervical adenopathy.   Skin:     General: Skin is warm.      Findings: No rash.   Neurological:      General: No focal deficit present.      Mental Status: She is alert and oriented to person, place, and time. Mental status is at baseline.      Cranial Nerves: No cranial nerve deficit.      Sensory: No sensory deficit.      Coordination: Coordination normal.      Gait: Gait normal.      Deep Tendon Reflexes: Reflexes normal.   Psychiatric:         Mood and Affect: Mood normal.         Behavior: Behavior normal.         Results for orders placed or performed in visit on 12/20/21   POCT urinalysis dipstick, automated    Specimen: Urine   Result Value Ref Range    Color Yellow Yellow, Straw, Dark Yellow, Dana    Clarity, UA Clear Clear    Specific Gravity  1.015 1.005 - 1.030    pH, Urine 6.0 5.0 - 8.0    Leukocytes Negative Negative    Nitrite, UA Negative Negative    Protein, POC Negative Negative mg/dL    Glucose, UA Negative Negative, 1000 mg/dL  (3+) mg/dL    Ketones, UA Negative Negative    Urobilinogen, UA Normal Normal    Bilirubin Negative Negative    Blood, UA 1+ (A) Negative    Lot Number [id number]     Expiration Date 6/30/23        Diagnoses and all orders for this visit:    1. Routine general medical examination at a health care facility (Primary)  -     POCT urinalysis dipstick, automated    2. Other insomnia  -     traZODone (DESYREL) 50 MG tablet; 1-2 tabs PO at HS for insomnia  Dispense: 60 tablet; Refill: 5    3. Hematuria, unspecified type  -     Urine Culture - Urine, Urine, Clean Catch; Future    4. Screening for colon cancer  -     Ambulatory Referral For Screening Colonoscopy      Check     Health Maintenance   Topic Date Due   • MAMMOGRAM  03/27/2021   • COLORECTAL CANCER SCREENING  09/30/2021   • ANNUAL PHYSICAL  12/21/2022   • PAP SMEAR  03/02/2023   • TDAP/TD VACCINES (3 - Td or Tdap) 11/13/2028   • HEPATITIS C SCREENING  Completed   • COVID-19 Vaccine  Completed   • INFLUENZA VACCINE  Completed   • ZOSTER VACCINE  Completed   • Pneumococcal Vaccine 0-64  Aged Out       Discussion/Summary  Impression: health maintenance visit. Currently, she eats an adequate diet and has an adequate exercise regimen.   Cervical cancer screening:Pap smear is current.   Breast cancer screening: mammogram is current.   Colorectal cancer screening: colonoscopy is current.  Osteoporosis screening: Bone mineral density test is due at 60 .   CT low dose screen - not indicated  Screening lab work includes hemoglobin, glucose, lipid profile, thyroid function testing, 25-hydroxyvitamin D and urinalysis.   Immunizations are needed, immunizations will be given as outlined in the orders   Advice and education were given regarding cardiovascular risk reduction, healthy dietary habits, Seatbelt and helmet use and self skin examination.     Return in about 1 year (around 12/20/2022) for Annual.      Electronically signed by:    Joana Hunter MD

## 2021-12-21 ENCOUNTER — TRANSCRIBE ORDERS (OUTPATIENT)
Dept: ADMINISTRATIVE | Facility: HOSPITAL | Age: 56
End: 2021-12-21

## 2021-12-21 DIAGNOSIS — Z12.31 VISIT FOR SCREENING MAMMOGRAM: Primary | ICD-10-CM

## 2021-12-23 LAB
BACTERIA UR CULT: NORMAL
BACTERIA UR CULT: NORMAL

## 2021-12-27 ENCOUNTER — HOSPITAL ENCOUNTER (OUTPATIENT)
Dept: MRI IMAGING | Facility: HOSPITAL | Age: 56
Discharge: HOME | End: 2021-12-27

## 2021-12-27 ENCOUNTER — LAB (OUTPATIENT)
Dept: LAB | Facility: HOSPITAL | Age: 56
End: 2021-12-27

## 2021-12-27 DIAGNOSIS — R51.9 LEFT TEMPORAL HEADACHE: ICD-10-CM

## 2021-12-27 DIAGNOSIS — H54.3 VISION LOSS, BILATERAL: ICD-10-CM

## 2021-12-27 DIAGNOSIS — Z00.00 ROUTINE PHYSICAL EXAMINATION: ICD-10-CM

## 2021-12-27 LAB
25(OH)D3 SERPL-MCNC: 44.3 NG/ML
ALBUMIN SERPL-MCNC: 4.5 G/DL (ref 3.5–5.2)
ALBUMIN/GLOB SERPL: 1.7 G/DL
ALP SERPL-CCNC: 40 U/L (ref 39–117)
ALT SERPL W P-5'-P-CCNC: 14 U/L (ref 1–33)
ANION GAP SERPL CALCULATED.3IONS-SCNC: 10.2 MMOL/L (ref 5–15)
AST SERPL-CCNC: 20 U/L (ref 1–32)
BILIRUB SERPL-MCNC: 0.3 MG/DL (ref 0–1.2)
BUN SERPL-MCNC: 16 MG/DL (ref 6–20)
BUN/CREAT SERPL: 17.6 (ref 7–25)
CALCIUM SPEC-SCNC: 9.6 MG/DL (ref 8.6–10.5)
CHLORIDE SERPL-SCNC: 105 MMOL/L (ref 98–107)
CHOLEST SERPL-MCNC: 153 MG/DL (ref 0–200)
CO2 SERPL-SCNC: 26.8 MMOL/L (ref 22–29)
CREAT SERPL-MCNC: 0.91 MG/DL (ref 0.57–1)
DEPRECATED RDW RBC AUTO: 40.2 FL (ref 37–54)
ERYTHROCYTE [DISTWIDTH] IN BLOOD BY AUTOMATED COUNT: 11.5 % (ref 12.3–15.4)
GFR SERPL CREATININE-BSD FRML MDRD: 64 ML/MIN/1.73
GLOBULIN UR ELPH-MCNC: 2.7 GM/DL
GLUCOSE SERPL-MCNC: 84 MG/DL (ref 65–99)
HBA1C MFR BLD: 4.93 % (ref 4.8–5.6)
HCT VFR BLD AUTO: 39.3 % (ref 34–46.6)
HDLC SERPL-MCNC: 61 MG/DL (ref 40–60)
HGB BLD-MCNC: 12.9 G/DL (ref 12–15.9)
LDLC SERPL CALC-MCNC: 81 MG/DL (ref 0–100)
LDLC/HDLC SERPL: 1.34 {RATIO}
MCH RBC QN AUTO: 31.5 PG (ref 26.6–33)
MCHC RBC AUTO-ENTMCNC: 32.8 G/DL (ref 31.5–35.7)
MCV RBC AUTO: 95.9 FL (ref 79–97)
PLATELET # BLD AUTO: 243 10*3/MM3 (ref 140–450)
PMV BLD AUTO: 10.7 FL (ref 6–12)
POTASSIUM SERPL-SCNC: 4 MMOL/L (ref 3.5–5.2)
PROT SERPL-MCNC: 7.2 G/DL (ref 6–8.5)
RBC # BLD AUTO: 4.1 10*6/MM3 (ref 3.77–5.28)
SODIUM SERPL-SCNC: 142 MMOL/L (ref 136–145)
TRIGL SERPL-MCNC: 52 MG/DL (ref 0–150)
TSH SERPL DL<=0.05 MIU/L-ACNC: 1.47 UIU/ML (ref 0.27–4.2)
VIT B12 BLD-MCNC: 655 PG/ML (ref 211–946)
VLDLC SERPL-MCNC: 11 MG/DL (ref 5–40)
WBC NRBC COR # BLD: 6.03 10*3/MM3 (ref 3.4–10.8)

## 2021-12-27 PROCEDURE — 83036 HEMOGLOBIN GLYCOSYLATED A1C: CPT

## 2021-12-27 PROCEDURE — 70551 MRI BRAIN STEM W/O DYE: CPT

## 2021-12-27 PROCEDURE — 85027 COMPLETE CBC AUTOMATED: CPT

## 2021-12-27 PROCEDURE — 80061 LIPID PANEL: CPT

## 2021-12-27 PROCEDURE — 80053 COMPREHEN METABOLIC PANEL: CPT

## 2021-12-27 PROCEDURE — 82306 VITAMIN D 25 HYDROXY: CPT

## 2021-12-27 PROCEDURE — 82607 VITAMIN B-12: CPT

## 2021-12-27 PROCEDURE — 84443 ASSAY THYROID STIM HORMONE: CPT

## 2021-12-31 NOTE — PROGRESS NOTES
I have reviewed the notes, assessments, and/or procedures performed by Charis Saavedra PA-C, I concur with her/his documentation of Delma Mccullough.

## 2022-01-21 ENCOUNTER — APPOINTMENT (OUTPATIENT)
Dept: MRI IMAGING | Facility: HOSPITAL | Age: 57
End: 2022-01-21

## 2022-02-28 ENCOUNTER — HOSPITAL ENCOUNTER (OUTPATIENT)
Dept: MAMMOGRAPHY | Facility: HOSPITAL | Age: 57
Discharge: HOME | End: 2022-02-28
Admitting: INTERNAL MEDICINE

## 2022-02-28 DIAGNOSIS — Z12.31 VISIT FOR SCREENING MAMMOGRAM: ICD-10-CM

## 2022-02-28 PROCEDURE — 77067 SCR MAMMO BI INCL CAD: CPT | Performed by: RADIOLOGY

## 2022-02-28 PROCEDURE — 77063 BREAST TOMOSYNTHESIS BI: CPT

## 2022-02-28 PROCEDURE — 77067 SCR MAMMO BI INCL CAD: CPT

## 2022-02-28 PROCEDURE — 77063 BREAST TOMOSYNTHESIS BI: CPT | Performed by: RADIOLOGY

## 2022-04-25 ENCOUNTER — OFFICE VISIT (OUTPATIENT)
Dept: OBSTETRICS AND GYNECOLOGY | Facility: CLINIC | Age: 57
End: 2022-04-25

## 2022-04-25 VITALS — DIASTOLIC BLOOD PRESSURE: 64 MMHG | WEIGHT: 164.8 LBS | SYSTOLIC BLOOD PRESSURE: 90 MMHG | BODY MASS INDEX: 25.81 KG/M2

## 2022-04-25 DIAGNOSIS — Z01.419 WOMEN'S ANNUAL ROUTINE GYNECOLOGICAL EXAMINATION: Primary | ICD-10-CM

## 2022-04-25 PROBLEM — J45.909 REACTIVE AIRWAY DISEASE: Status: ACTIVE | Noted: 2022-04-25

## 2022-04-25 PROBLEM — J30.9 ATOPIC RHINITIS: Status: ACTIVE | Noted: 2022-04-25

## 2022-04-25 PROBLEM — J06.9 UPPER RESPIRATORY TRACT INFECTION: Status: ACTIVE | Noted: 2022-04-25

## 2022-04-25 PROBLEM — M75.80 ROTATOR CUFF TENDONITIS: Status: ACTIVE | Noted: 2022-04-25

## 2022-04-25 PROBLEM — J20.9 ACUTE BRONCHITIS: Status: ACTIVE | Noted: 2022-04-25

## 2022-04-25 PROCEDURE — 99396 PREV VISIT EST AGE 40-64: CPT | Performed by: OBSTETRICS & GYNECOLOGY

## 2022-04-25 NOTE — PROGRESS NOTES
Subjective     Chief Complaint   Patient presents with   • Annual Exam     Would like to discuss the hot flashes she has been having       Delma Mccullough is a 56 y.o. year old  presenting to be seen for her annual exam.      Her LMP was No LMP recorded. Patient is perimenopausal..  Her cycles are absent.  Usually her flow is typically normal with no more than 0 days of heavy flow each menses.   Additionally she describes no other symptoms associated with her menses. No menses for about 6 months.    She is sexually active.  In the past 12 months there have not been new sexual partners.  .  She would not like to be screened for STD's at today's exam.     Current contraceptive methods being used: none and she is in a same sex relationship.      She exercises regularly: yes.  She wears her seat belt: yes.  She has concerns about domestic violence: no.  Health Maintenance, Female  Adopting a healthy lifestyle and getting preventive care are important in promoting health and wellness. Ask your health care provider about:  · The right schedule for you to have regular tests and exams.  · Things you can do on your own to prevent diseases and keep yourself healthy.  What should I know about diet, weight, and exercise?  Eat a healthy diet    · Eat a diet that includes plenty of vegetables, fruits, low-fat dairy products, and lean protein.  · Do not eat a lot of foods that are high in solid fats, added sugars, or sodium.     Maintain a healthy weight  Body mass index (BMI) is used to identify weight problems. It estimates body fat based on height and weight. Your health care provider can help determine your BMI and help you achieve or maintain a healthy weight.  Get regular exercise  Get regular exercise. This is one of the most important things you can do for your health. Most adults should:  · Exercise for at least 150 minutes each week. The exercise should increase your heart rate and make you sweat (moderate-intensity  exercise).  · Do strengthening exercises at least twice a week. This is in addition to the moderate-intensity exercise.  · Spend less time sitting. Even light physical activity can be beneficial.  Watch cholesterol and blood lipids  Have your blood tested for lipids and cholesterol at 20 years of age, then have this test every 5 years.  Have your cholesterol levels checked more often if:  · Your lipid or cholesterol levels are high.  · You are older than 40 years of age.  · You are at high risk for heart disease.  What should I know about cancer screening?  Depending on your health history and family history, you may need to have cancer screening at various ages. This may include screening for:  · Breast cancer.  · Cervical cancer.  · Colorectal cancer.  · Skin cancer.  · Lung cancer.  What should I know about heart disease, diabetes, and high blood pressure?  Blood pressure and heart disease  · High blood pressure causes heart disease and increases the risk of stroke. This is more likely to develop in people who have high blood pressure readings, are of  descent, or are overweight.  · Have your blood pressure checked:  ? Every 3-5 years if you are 18-39 years of age.  ? Every year if you are 40 years old or older.  Diabetes  Have regular diabetes screenings. This checks your fasting blood sugar level. Have the screening done:  · Once every three years after age 40 if you are at a normal weight and have a low risk for diabetes.  · More often and at a younger age if you are overweight or have a high risk for diabetes.  What should I know about preventing infection?  Hepatitis B  If you have a higher risk for hepatitis B, you should be screened for this virus. Talk with your health care provider to find out if you are at risk for hepatitis B infection.  Hepatitis C  Testing is recommended for:  · Everyone born from 1945 through 1965.  · Anyone with known risk factors for hepatitis C.  Sexually transmitted  infections (STIs)  · Get screened for STIs, including gonorrhea and chlamydia, if:  ? You are sexually active and are younger than 24 years of age.  ? You are older than 24 years of age and your health care provider tells you that you are at risk for this type of infection.  ? Your sexual activity has changed since you were last screened, and you are at increased risk for chlamydia or gonorrhea. Ask your health care provider if you are at risk.  · Ask your health care provider about whether you are at high risk for HIV. Your health care provider may recommend a prescription medicine to help prevent HIV infection. If you choose to take medicine to prevent HIV, you should first get tested for HIV. You should then be tested every 3 months for as long as you are taking the medicine.  Pregnancy  · If you are about to stop having your period (premenopausal) and you may become pregnant, seek counseling before you get pregnant.  · Take 400 to 800 micrograms (mcg) of folic acid every day if you become pregnant.  · Ask for birth control (contraception) if you want to prevent pregnancy.  Osteoporosis and menopause  Osteoporosis is a disease in which the bones lose minerals and strength with aging. This can result in bone fractures. If you are 65 years old or older, or if you are at risk for osteoporosis and fractures, ask your health care provider if you should:  · Be screened for bone loss.  · Take a calcium or vitamin D supplement to lower your risk of fractures.  · Be given hormone replacement therapy (HRT) to treat symptoms of menopause.  Follow these instructions at home:  Lifestyle  · Do not use any products that contain nicotine or tobacco, such as cigarettes, e-cigarettes, and chewing tobacco. If you need help quitting, ask your health care provider.  · Do not use street drugs.  · Do not share needles.  · Ask your health care provider for help if you need support or information about quitting drugs.  Alcohol use  · Do  not drink alcohol if:  ? Your health care provider tells you not to drink.  ? You are pregnant, may be pregnant, or are planning to become pregnant.  · If you drink alcohol:  ? Limit how much you use to 0-1 drink a day.  ? Limit intake if you are breastfeeding.  · Be aware of how much alcohol is in your drink. In the [place] one drink equals one 12 oz bottle of beer (355 mL), one 5 oz glass of wine (148 mL), or one 1½ oz glass of hard liquor (44 mL).  General instructions  · Schedule regular health, dental, and eye exams.  · Stay current with your vaccines.  · Tell your health care provider if:  ? You often feel depressed.  ? You have ever been abused or do not feel safe at home.  Summary  · Adopting a healthy lifestyle and getting preventive care are important in promoting health and wellness.  · Follow your health care provider's instructions about healthy diet, exercising, and getting tested or screened for diseases.  · Follow your health care provider's instructions on monitoring your cholesterol and blood pressure.    GYN screening history:  · Last pap: she reports her last PAP was normal  · Last mammogram: she reports her last mammogram was normal  · Last fasting lipid profile: she reports her last lipid panel was normal  · Last colonoscopy: she reports her last colonoscopy was normal  · Last DEXA: she has never had a DEXA.    Discussed vasomotor symptoms, risk and benefits of HRT    The following portions of the patient's history were reviewed and updated as appropriate:vital signs, allergies, current medications, past medical history, past social history, past surgical history and problem list.    Review of Systems  Pertinent items are noted in HPI.     Physical Exam    Objective     BP 90/64 (BP Location: Right arm, Patient Position: Sitting, Cuff Size: Adult)   Wt 74.8 kg (164 lb 12.8 oz)   BMI 25.81 kg/m²       General:  well developed; well nourished  no acute distress   Constitutional: healthy   Skin:   No suspicious lesions seen   Thyroid: normal to inspection and palpation   Lungs:  breathing is unlabored  clear to auscultation bilaterally   Heart:  regular rate and rhythm, S1, S2 normal, no murmur, click, rub or gallop   Breasts:  Examined in supine position  Symmetric without masses or skin dimpling  Nipples normal without inversion, lesions or discharge  There are no palpable axillary nodes   Abdomen: soft, non-tender; no masses  no umbilical or inginual hernias are present  no hepato-splenomegaly   Pelvis: Clinical staff was present for exam  External genitalia:  normal appearance of the external genitalia including Bartholin's and Chubbuck's glands.  :  urethral meatus normal; urethral hypermobility is absent.  Vaginal:  atrophic mucosal changes are present;  Cervix:  normal appearance  Uterus:  normal size, shape and consistency  Adnexa:  normal bimanual exam of the adnexa.   Musculoskeletal: negative   Neuro: normal without focal findings, mental status, speech normal, alert and oriented x3 and ERIS   Psych: oriented to time, place and person, mood and affect are within normal limits, pt is a good historian; no memory problems were noted       Lab Review   CBC, CMP and TSH    Imaging  Mammogram report    Assessment/Plan     ASSESSMENT  1. Women's annual routine gynecological examination        PLAN  No orders of the defined types were placed in this encounter.    No orders of the defined types were placed in this encounter.        Follow up: 1 year(s)         This note was electronically signed.    Josef Campos MD  April 25, 2022

## 2022-12-14 ENCOUNTER — TELEPHONE (OUTPATIENT)
Dept: OBSTETRICS AND GYNECOLOGY | Facility: CLINIC | Age: 57
End: 2022-12-14

## 2022-12-14 NOTE — TELEPHONE ENCOUNTER
Dr. Campos advised for patient to be seen in office first, called patient and informed of response, she verified understanding and we scheduled her for first avail gyn f/u.

## 2022-12-14 NOTE — TELEPHONE ENCOUNTER
Andres pt called requesting to get a prescription for HRT, has discussed in the past but refused, is now needing it. Please advise

## 2022-12-15 PROBLEM — N95.1 MENOPAUSAL SYMPTOMS: Status: ACTIVE | Noted: 2022-12-15

## 2022-12-19 ENCOUNTER — OFFICE VISIT (OUTPATIENT)
Dept: OBSTETRICS AND GYNECOLOGY | Facility: CLINIC | Age: 57
End: 2022-12-19

## 2022-12-19 VITALS
HEIGHT: 67 IN | DIASTOLIC BLOOD PRESSURE: 66 MMHG | SYSTOLIC BLOOD PRESSURE: 110 MMHG | BODY MASS INDEX: 25.74 KG/M2 | WEIGHT: 164 LBS

## 2022-12-19 DIAGNOSIS — N95.1 MENOPAUSAL SYMPTOMS: Primary | ICD-10-CM

## 2022-12-19 PROCEDURE — 99214 OFFICE O/P EST MOD 30 MIN: CPT | Performed by: OBSTETRICS & GYNECOLOGY

## 2022-12-19 NOTE — PROGRESS NOTES
"Subjective   Chief Complaint   Patient presents with   • Follow-up     Discuss HRT wants to try hormones having hot flashes and mood up and down.     Delma Mccullough is a 57 y.o. year old .  No LMP recorded. Patient is perimenopausal.  She wants to discuss HRT. Menopausal for over 2 years. Very few vasomotor symptoms. Primary complaints are Mood Issues  Patient complains of depression. She complains of anhedonia, depressed mood, fatigue and insomnia. Onset was approximately 2 years ago. Symptoms have been gradually worsening since that time. Current symptoms include: anhedonia, depressed mood, fatigue and poor sleep weapy. Patient denies S/H Ideation. Family history significant for recent death of parents. Possible organic causes contributing are: none. Risk factors: greif marital discord. Previous treatment includes none. She complains of the following side effects from the treatment: NA.      OTHER THINGS SHE WANTS TO DISCUSS TODAY:  Nothing else    The following portions of the patient's history were reviewed and updated as appropriate:current medications and allergies    Social History    Tobacco Use      Smoking status: Former        Packs/day: 0.25        Years: 15.00        Pack years: 3.75        Types: Cigarettes        Quit date: 1999        Years since quittin.5      Smokeless tobacco: Never    Review of Systems  Constitutional POS: nothing reported    NEG: anorexia or night sweats   Genitourinary POS: nothing reported    NEG: dysuria or hematuria   Gastointestinal POS: nothing reported    NEG: bloating, change in bowel habits, melena or reflux symptoms   Integument POS: nothing reported    NEG: moles that are changing in size, shape, color or rashes   Breast POS: nothing reported    NEG: persistent breast lump, skin dimpling or nipple discharge         Objective   /66   Ht 170.2 cm (67\")   Wt 74.4 kg (164 lb)   Breastfeeding No   BMI 25.69 kg/m²       Lab Review   No data " reviewed    Imaging   No data reviewed        Assessment   1. Affective disorder. Although it is possible that her symptoms would be related to the menopause and chronic sleep deprivation. I think the primary issue and complaint when the onion is peeled back is her overall mood.     Plan   1. Will refer to [facility]  2. The importance of keeping all planned follow-up and taking all medications as prescribed was emphasized.  3. Follow up 6 weeks if not seen by APRN    I spent 35 minutes caring for Delma on this date of service. This time includes time spent by me in the following activities: obtaining and/or reviewing a separately obtained history, performing a medically appropriate examination and/or evaluation, counseling and educating the patient/family/caregiver, referring and communicating with other health care professionals and documenting information in the medical record.       Electronically signed by Josef Campos MD, 12/19/22, 1:36 PM EST.

## 2023-07-24 ENCOUNTER — OFFICE VISIT (OUTPATIENT)
Dept: INTERNAL MEDICINE | Facility: CLINIC | Age: 58
End: 2023-07-24
Payer: COMMERCIAL

## 2023-07-24 VITALS
TEMPERATURE: 97.4 F | BODY MASS INDEX: 27 KG/M2 | DIASTOLIC BLOOD PRESSURE: 78 MMHG | HEIGHT: 67 IN | HEART RATE: 56 BPM | SYSTOLIC BLOOD PRESSURE: 130 MMHG | OXYGEN SATURATION: 94 % | WEIGHT: 172 LBS

## 2023-07-24 DIAGNOSIS — R07.81 RIB PAIN ON LEFT SIDE: ICD-10-CM

## 2023-07-24 DIAGNOSIS — R31.29 MICROSCOPIC HEMATURIA: ICD-10-CM

## 2023-07-24 DIAGNOSIS — R35.0 URINARY FREQUENCY: Primary | ICD-10-CM

## 2023-07-24 DIAGNOSIS — R10.9 LEFT FLANK PAIN: ICD-10-CM

## 2023-07-24 DIAGNOSIS — M54.50 LOW BACK PAIN, UNSPECIFIED BACK PAIN LATERALITY, UNSPECIFIED CHRONICITY, UNSPECIFIED WHETHER SCIATICA PRESENT: ICD-10-CM

## 2023-07-24 PROCEDURE — 87086 URINE CULTURE/COLONY COUNT: CPT | Performed by: NURSE PRACTITIONER

## 2023-07-24 RX ORDER — PARSLEY 450 MG
500 CAPSULE ORAL 2 TIMES DAILY
COMMUNITY

## 2023-07-25 ENCOUNTER — HOSPITAL ENCOUNTER (OUTPATIENT)
Dept: GENERAL RADIOLOGY | Facility: HOSPITAL | Age: 58
Discharge: HOME | End: 2023-07-25
Admitting: NURSE PRACTITIONER
Payer: COMMERCIAL

## 2023-07-25 DIAGNOSIS — R10.9 LEFT FLANK PAIN: ICD-10-CM

## 2023-07-25 DIAGNOSIS — R07.81 RIB PAIN ON LEFT SIDE: ICD-10-CM

## 2023-07-25 DIAGNOSIS — R31.29 MICROSCOPIC HEMATURIA: ICD-10-CM

## 2023-07-25 PROCEDURE — 74018 RADEX ABDOMEN 1 VIEW: CPT

## 2023-07-25 PROCEDURE — 71046 X-RAY EXAM CHEST 2 VIEWS: CPT

## 2023-07-26 LAB — BACTERIA SPEC AEROBE CULT: NO GROWTH

## 2023-08-08 NOTE — PROGRESS NOTES
Office Note     Name: Delma Mccullough    : 1965     MRN: [id number]     Chief Complaint  Flank Pain (UQ left side: close to her rib cage. Started a month ago. Comes and goes. Denies any injuries to that side. Unable to take deep breaths. Would like to start off with an xray. )    Subjective     History of Present Illness:  Delma Mccullough is a 57 y.o. female who presents today for evaluation of acute complaints.  Patient complains of left upper quadrant pain and discomfort close to her rib cage area.  She reports onset of symptoms was about 1 month ago.  She reports the discomfort is intermittent and seems to come and go.  It has been present for about 4 to 5 days this episode.  She denies any known injury to that area.  She does report an increase in discomfort when trying to take deep breaths or when laying on her side when trying to sleep.  She also notes increased pressure with eating..  She denies any shortness of breath.  She denies a cough.  She denies any known history of kidney stones.  She denies any dysuria or lower urinary tract symptoms.  Bowel movements have been normal for her.  She does report occasional alcohol use but nothing regularly.  She presents today for evaluation of the above complaints.  She would like to start with an x-ray for evaluation.  She follows with Dr. Hunter for chronic conditions.  She denies any further complaints or concerns at this time.      Past Medical History:   Diagnosis Date    Cancer 2017    Skin - Basal cell face    Constipation     Low back pain        Past Surgical History:   Procedure Laterality Date    COLONOSCOPY  2016    negative    D & C HYSTEROSCOPY  2014    DIAGNOSTIC LAPAROSCOPY  1985    SHOULDER SURGERY      SHOULDER SURGERY Left        Social History     Socioeconomic History    Marital status:    Tobacco Use    Smoking status: Former     Packs/day: 0.25     Years: 10.00     Pack years: 2.50     Types: Cigarettes     Quit  "date: 1999     Years since quittin.6    Smokeless tobacco: Never   Substance and Sexual Activity    Alcohol use: No     Comment: socially    Drug use: No    Sexual activity: Yes     Partners: Female     Birth control/protection: None         Current Outpatient Medications:     Ashwagandha 500 MG capsule, Take 500 mg by mouth 2 (Two) Times a Day., Disp: , Rfl:     Multiple Vitamins-Minerals (MULTIVITAMIN ADULT) tablet, Take 1 tablet by mouth Daily., Disp: , Rfl:     traZODone (DESYREL) 50 MG tablet, 1-2 tabs PO at HS for insomnia, Disp: 60 tablet, Rfl: 5    VITAMIN C, CALCIUM ASCORBATE, PO, Take  by mouth., Disp: , Rfl:     VITAMIN D, CHOLECALCIFEROL, PO, Take  by mouth., Disp: , Rfl:     Objective     Vital Signs  /78   Pulse 56   Temp 97.4 øF (36.3 øC)   Ht 170.2 cm (67.01\")   Wt 78 kg (172 lb)   SpO2 94%   BMI 26.93 kg/mý   Estimated body mass index is 26.93 kg/mý as calculated from the following:    Height as of this encounter: 170.2 cm (67.01\").    Weight as of this encounter: 78 kg (172 lb).    BMI is >= 25 and <30. (Overweight) The following options were offered after discussion;: Not addressed at this visit      Physical Exam  Constitutional:       General: She is not in acute distress.     Appearance: Normal appearance. She is not ill-appearing.   HENT:      Head: Normocephalic and atraumatic.      Nose: Nose normal.   Eyes:      Extraocular Movements: Extraocular movements intact.      Conjunctiva/sclera: Conjunctivae normal.      Pupils: Pupils are equal, round, and reactive to light.   Cardiovascular:      Rate and Rhythm: Normal rate and regular rhythm.      Heart sounds: Normal heart sounds.   Pulmonary:      Effort: Pulmonary effort is normal. No respiratory distress.      Breath sounds: Normal breath sounds.   Abdominal:      Palpations: Abdomen is soft.      Tenderness: There is abdominal tenderness.   Musculoskeletal:         General: Normal range of motion.      Cervical back: " Neck supple.      Comments: Pinpoint area to left upper quadrant area reproducible and tender to palpation   Skin:     General: Skin is warm and dry.   Neurological:      General: No focal deficit present.      Mental Status: She is alert and oriented to person, place, and time. Mental status is at baseline.   Psychiatric:         Mood and Affect: Mood normal.         Behavior: Behavior normal.         Thought Content: Thought content normal.         Judgment: Judgment normal.        Assessment and Plan     Diagnoses and all orders for this visit:    1. Urinary frequency (Primary)  -     POCT urinalysis dipstick, automated  -     Urine Culture - Urine, Urine, Clean Catch; Future  -     Urine Culture - Urine, Urine, Clean Catch    2. Low back pain, unspecified back pain laterality, unspecified chronicity, unspecified whether sciatica present  -     POCT urinalysis dipstick, automated  -     Urine Culture - Urine, Urine, Clean Catch; Future  -     Urine Culture - Urine, Urine, Clean Catch    3. Left flank pain  -     XR Abdomen KUB; Future    4. Microscopic hematuria  -     XR Abdomen KUB; Future    5. Rib pain on left side  -     XR Chest PA & Lateral; Future    Plan:  UA in the office today with trace blood.  Patient reports she always has blood in her urine.  She has been evaluated by urology in the past.  Will send urine for culture.  Orders placed for a KUB and chest x-ray for further evaluation of complaints.  Imaging results to be reviewed with the patient once available.  Further plan of care based on imaging findings.  Return to clinic if symptoms worsen or fail to improve.  Follow-up with Dr. Hunter.    Follow Up  Return if symptoms worsen or fail to improve, for Follow up with Dr. Hunter.    MARGARITA Helms    Part of this note may be an electronic transcription/translation of spoken language to printed text using the Dragon Dictation System.

## 2023-08-22 ENCOUNTER — TELEPHONE (OUTPATIENT)
Dept: INTERNAL MEDICINE | Facility: CLINIC | Age: 58
End: 2023-08-22

## 2023-08-22 NOTE — TELEPHONE ENCOUNTER
Caller: Delma Mccullough    Relationship: Self    Best call back number: [phone]    What medication are you requesting: PCP DISCRETION/ LOW DOSAGE    What are your current symptoms: NIGHT SWEATS    How long have you been experiencing symptoms: 4 MONTHS    Have you had these symptoms before:    [] Yes  [x] No    Have you been treated for these symptoms before:   [] Yes  [x] No    If a prescription is needed, what is your preferred pharmacy and phone number:      [id number] - [address] AT [facility] & [place] - [phone] [facility] [phone]          Additional notes:

## 2023-08-22 NOTE — TELEPHONE ENCOUNTER
Pt states spoke w/ you at last appt, would like to see about rx to help w/ sxs of hot flashes/night sweats. States interrupting sleep.

## 2023-09-11 ENCOUNTER — LAB (OUTPATIENT)
Dept: LAB | Facility: HOSPITAL | Age: 58
End: 2023-09-11
Payer: COMMERCIAL

## 2023-09-11 ENCOUNTER — OFFICE VISIT (OUTPATIENT)
Dept: INTERNAL MEDICINE | Facility: CLINIC | Age: 58
End: 2023-09-11
Payer: COMMERCIAL

## 2023-09-11 VITALS
SYSTOLIC BLOOD PRESSURE: 120 MMHG | BODY MASS INDEX: 26.84 KG/M2 | OXYGEN SATURATION: 96 % | DIASTOLIC BLOOD PRESSURE: 78 MMHG | WEIGHT: 171 LBS | HEART RATE: 60 BPM | HEIGHT: 67 IN | TEMPERATURE: 97.7 F

## 2023-09-11 DIAGNOSIS — N95.1 MENOPAUSAL SYMPTOMS: Primary | ICD-10-CM

## 2023-09-11 DIAGNOSIS — N95.1 MENOPAUSAL SYMPTOMS: ICD-10-CM

## 2023-09-11 PROCEDURE — 84443 ASSAY THYROID STIM HORMONE: CPT

## 2023-09-11 PROCEDURE — 99214 OFFICE O/P EST MOD 30 MIN: CPT | Performed by: INTERNAL MEDICINE

## 2023-09-11 PROCEDURE — 83001 ASSAY OF GONADOTROPIN (FSH): CPT

## 2023-09-11 PROCEDURE — 82670 ASSAY OF TOTAL ESTRADIOL: CPT

## 2023-09-11 NOTE — PROGRESS NOTES
"Night Sweats (For a couple years,  that have gotten worse over the past 3 months)    Subjective   Delma Mccullough is a 57 y.o. female is here today for follow-up.    History of Present Illness  Has been having more night sweats. Hot flashes came and went.but are back again, also having night sweats a lot - every night, stays drenched.  Mom had endometrial cancer, Dad had bladder cancer.        Current Outpatient Medications:     Ashwagandha 500 MG capsule, Take 500 mg by mouth 2 (Two) Times a Day., Disp: , Rfl:     FIBER ADULT GUMMIES PO, Take  by mouth., Disp: , Rfl:     Multiple Vitamins-Minerals (MULTIVITAMIN ADULT) tablet, Take 1 tablet by mouth Daily., Disp: , Rfl:     traZODone (DESYREL) 50 MG tablet, 1-2 tabs PO at HS for insomnia, Disp: 60 tablet, Rfl: 5    VITAMIN D, CHOLECALCIFEROL, PO, Take  by mouth., Disp: , Rfl:     estradiol-norethindrone (COMBIPATCH) 0.05-0.14 MG/DAY patch, Place 1 patch on the skin as directed by provider 2 (Two) Times a Week., Disp: 8 each, Rfl: 11      The following portions of the patient's history were reviewed and updated as appropriate: allergies, current medications, past family history, past medical history, past social history, past surgical history and problem list.    Review of Systems   Constitutional: Negative.  Negative for chills and fever.   HENT:  Negative for ear discharge, ear pain, sinus pressure and sore throat.    Respiratory:  Negative for cough, chest tightness and shortness of breath.    Cardiovascular:  Negative for chest pain, palpitations and leg swelling.   Gastrointestinal:  Negative for diarrhea, nausea and vomiting.   Endocrine: Positive for heat intolerance.   Musculoskeletal:  Negative for arthralgias, back pain and myalgias.   Neurological:  Negative for dizziness, syncope and headaches.   Psychiatric/Behavioral:  Negative for confusion and sleep disturbance.      Objective   /78   Pulse 60   Temp 97.7 °F (36.5 °C)   Ht 170.2 cm (67.01\")   " Wt 77.6 kg (171 lb)   SpO2 96% Comment: ra  BMI 26.77 kg/m²   Physical Exam  Vitals and nursing note reviewed.   Constitutional:       Appearance: She is well-developed.   HENT:      Head: Normocephalic and atraumatic.      Right Ear: External ear normal.      Left Ear: External ear normal.      Mouth/Throat:      Pharynx: No oropharyngeal exudate.   Eyes:      Conjunctiva/sclera: Conjunctivae normal.      Pupils: Pupils are equal, round, and reactive to light.   Neck:      Thyroid: No thyromegaly.   Cardiovascular:      Rate and Rhythm: Normal rate and regular rhythm.      Pulses: Normal pulses.      Heart sounds: Normal heart sounds. No murmur heard.    No friction rub. No gallop.   Pulmonary:      Effort: Pulmonary effort is normal.      Breath sounds: Normal breath sounds.   Abdominal:      General: Bowel sounds are normal. There is no distension.      Palpations: Abdomen is soft.      Tenderness: There is no abdominal tenderness.   Musculoskeletal:      Cervical back: Neck supple.   Skin:     General: Skin is warm and dry.   Neurological:      Mental Status: She is alert and oriented to person, place, and time.      Cranial Nerves: No cranial nerve deficit.   Psychiatric:         Judgment: Judgment normal.         Results for orders placed or performed in visit on 07/24/23   Urine Culture - Urine, Urine, Clean Catch    Specimen: Urine, Clean Catch   Result Value Ref Range    Urine Culture No growth    POCT urinalysis dipstick, automated    Specimen: Urine   Result Value Ref Range    Color Yellow Yellow, Straw, Dark Yellow, Dana    Clarity, UA Clear Clear    Specific Gravity  1.010 1.005 - 1.030    pH, Urine 6.0 5.0 - 8.0    Leukocytes Negative Negative    Nitrite, UA Negative Negative    Protein, POC Negative Negative mg/dL    Glucose, UA Negative Negative mg/dL    Ketones, UA Negative Negative    Urobilinogen, UA Normal Normal, 0.2 E.U./dL    Bilirubin Negative Negative    Blood, UA Trace (A) Negative     Lot Number [id number]     Expiration Date 10/25/2024              Assessment & Plan   Diagnoses and all orders for this visit:    Menopausal symptoms  -     estradiol-norethindrone (COMBIPATCH) 0.05-0.14 MG/DAY patch; Place 1 patch on the skin as directed by provider 2 (Two) Times a Week.  -     Follicle Stimulating Hormone; Future  -     Estradiol; Future  -     TSH Rfx On Abnormal To Free T4; Future    Other orders  -     FIBER ADULT GUMMIES PO; Take  by mouth.    Start patch and the block kohosh/ provitalize supplement. Cinb, will add effexor.             Return in about 6 months (around 3/11/2024) for Annual.    Electronically signed by:    Joana Hunter MD

## 2023-09-12 LAB
ESTRADIOL SERPL HS-MCNC: 11.2 PG/ML
FSH SERPL-ACNC: 85.2 MIU/ML
TSH SERPL DL<=0.05 MIU/L-ACNC: 1.38 UIU/ML (ref 0.27–4.2)

## 2023-10-03 ENCOUNTER — TELEPHONE (OUTPATIENT)
Dept: INTERNAL MEDICINE | Facility: CLINIC | Age: 58
End: 2023-10-03
Payer: COMMERCIAL

## 2023-10-03 NOTE — TELEPHONE ENCOUNTER
Caller: Delma Mccullough    Relationship: Self    Best call back number:     [phone] (Mobile)     What is the best time to reach you:     ANY TIME    Who are you requesting to speak with (clinical staff, provider,  specific staff member):     DR MATA'S NURSE    What was the call regarding:     CALLER REQUESTED A CALL BACK TO ALERT DR MATA THAT HER INSURANCE DOESN'T COVER ANY [facility]    CALLER WOULD LIKE TO DISCUSS OTHER OPTIONS FOR PHARMACY

## 2023-10-03 NOTE — TELEPHONE ENCOUNTER
Pt is changing to [facility], chart has been updated.  Pt will call them and have scripts form [facility] transferred.

## 2024-05-29 ENCOUNTER — TELEPHONE (OUTPATIENT)
Dept: INTERNAL MEDICINE | Facility: CLINIC | Age: 59
End: 2024-05-29
Payer: COMMERCIAL

## 2024-05-29 RX ORDER — PROPRANOLOL HYDROCHLORIDE 10 MG/1
10 TABLET ORAL 3 TIMES DAILY PRN
Qty: 30 TABLET | Refills: 0 | Status: SHIPPED | OUTPATIENT
Start: 2024-05-29

## 2024-05-29 NOTE — TELEPHONE ENCOUNTER
Caller: Delma Mccullough    Relationship: Self    Best call back number: [phone]     What medication are you requesting: SOMETHING TO HELP WITH ANXIETY DURING FLYING     If a prescription is needed, what is your preferred pharmacy and phone number: [id number] [place], [place] - [address] AT [facility] [address] - [phone]  - [phone]      Additional notes: PATIENT WILL BE GOING ON A TRIP 6/15 AND WOULD LIKE TO KNOW IF SOMETHING CAN BE PRESCRIBED TO HELP WITH NERVES.

## 2024-11-08 ENCOUNTER — TRANSCRIBE ORDERS (OUTPATIENT)
Dept: ADMINISTRATIVE | Facility: HOSPITAL | Age: 59
End: 2024-11-08
Payer: COMMERCIAL

## 2024-11-08 DIAGNOSIS — Z12.31 SCREENING MAMMOGRAM FOR BREAST CANCER: Primary | ICD-10-CM

## 2024-11-15 ENCOUNTER — HOSPITAL ENCOUNTER (OUTPATIENT)
Dept: MAMMOGRAPHY | Facility: HOSPITAL | Age: 59
Discharge: HOME | End: 2024-11-15
Admitting: INTERNAL MEDICINE
Payer: COMMERCIAL

## 2024-11-15 DIAGNOSIS — Z12.31 SCREENING MAMMOGRAM FOR BREAST CANCER: ICD-10-CM

## 2024-11-15 LAB
NCCN CRITERIA FLAG: ABNORMAL
TYRER CUZICK SCORE: 10.5

## 2024-11-15 PROCEDURE — 77067 SCR MAMMO BI INCL CAD: CPT

## 2024-11-15 PROCEDURE — 77063 BREAST TOMOSYNTHESIS BI: CPT

## 2024-11-30 ENCOUNTER — DOCUMENTATION (OUTPATIENT)
Dept: GENETICS | Facility: HOSPITAL | Age: 59
End: 2024-11-30
Payer: COMMERCIAL

## 2024-11-30 NOTE — PROGRESS NOTES
Meets NCCN Criteria for Genetic Testing  Declines genetic testing? Y   Reason for decline? Could Not Reach Patient   If Reason for Decline is Previous Testing    Amb CARE     Non-CARE     Non-CARE Confirmation    Navigator Confirmed?     Patient Reported?     Elevated Tyrer-Cuzick Score ? Or Equal to 20%    Declines referral?     Reason for decline?

## 2025-02-07 ENCOUNTER — PATIENT ROUNDING (BHMG ONLY) (OUTPATIENT)
Dept: URGENT CARE | Facility: CLINIC | Age: 60
End: 2025-02-07
Payer: COMMERCIAL

## 2025-04-21 ENCOUNTER — OFFICE VISIT (OUTPATIENT)
Dept: INTERNAL MEDICINE | Facility: CLINIC | Age: 60
End: 2025-04-21
Payer: COMMERCIAL

## 2025-04-21 ENCOUNTER — LAB (OUTPATIENT)
Dept: LAB | Facility: HOSPITAL | Age: 60
End: 2025-04-21
Payer: COMMERCIAL

## 2025-04-21 VITALS
SYSTOLIC BLOOD PRESSURE: 128 MMHG | OXYGEN SATURATION: 98 % | WEIGHT: 175.6 LBS | DIASTOLIC BLOOD PRESSURE: 72 MMHG | HEIGHT: 67 IN | BODY MASS INDEX: 27.56 KG/M2 | HEART RATE: 58 BPM | TEMPERATURE: 97.7 F

## 2025-04-21 DIAGNOSIS — N95.1 MENOPAUSAL SYMPTOMS: ICD-10-CM

## 2025-04-21 DIAGNOSIS — Z12.11 SCREENING FOR COLON CANCER: ICD-10-CM

## 2025-04-21 DIAGNOSIS — E55.9 VITAMIN D DEFICIENCY: ICD-10-CM

## 2025-04-21 DIAGNOSIS — Z00.00 ROUTINE GENERAL MEDICAL EXAMINATION AT A HEALTH CARE FACILITY: ICD-10-CM

## 2025-04-21 DIAGNOSIS — N39.0 URINARY TRACT INFECTION WITH HEMATURIA, SITE UNSPECIFIED: ICD-10-CM

## 2025-04-21 DIAGNOSIS — Z00.00 ROUTINE GENERAL MEDICAL EXAMINATION AT A HEALTH CARE FACILITY: Primary | ICD-10-CM

## 2025-04-21 DIAGNOSIS — R31.9 URINARY TRACT INFECTION WITH HEMATURIA, SITE UNSPECIFIED: ICD-10-CM

## 2025-04-21 LAB
25(OH)D3 SERPL-MCNC: 42.1 NG/ML (ref 30–100)
ALBUMIN SERPL-MCNC: 4.5 G/DL (ref 3.5–5.2)
ALBUMIN/GLOB SERPL: 1.8 G/DL
ALP SERPL-CCNC: 64 U/L (ref 39–117)
ALT SERPL W P-5'-P-CCNC: 25 U/L (ref 1–33)
ANION GAP SERPL CALCULATED.3IONS-SCNC: 7.6 MMOL/L (ref 5–15)
AST SERPL-CCNC: 20 U/L (ref 1–32)
BILIRUB BLD-MCNC: NEGATIVE MG/DL
BILIRUB SERPL-MCNC: 0.3 MG/DL (ref 0–1.2)
BUN SERPL-MCNC: 16 MG/DL (ref 6–20)
BUN/CREAT SERPL: 20 (ref 7–25)
CALCIUM SPEC-SCNC: 9.8 MG/DL (ref 8.6–10.5)
CHLORIDE SERPL-SCNC: 104 MMOL/L (ref 98–107)
CHOLEST SERPL-MCNC: 180 MG/DL (ref 0–200)
CLARITY, POC: CLEAR
CO2 SERPL-SCNC: 25.4 MMOL/L (ref 22–29)
COLOR UR: YELLOW
CREAT SERPL-MCNC: 0.8 MG/DL (ref 0.57–1)
DEPRECATED RDW RBC AUTO: 39.6 FL (ref 37–54)
EGFRCR SERPLBLD CKD-EPI 2021: 86.1 ML/MIN/1.73
ERYTHROCYTE [DISTWIDTH] IN BLOOD BY AUTOMATED COUNT: 11.8 % (ref 12.3–15.4)
EXPIRATION DATE: ABNORMAL
GLOBULIN UR ELPH-MCNC: 2.5 GM/DL
GLUCOSE SERPL-MCNC: 88 MG/DL (ref 65–99)
GLUCOSE UR STRIP-MCNC: NEGATIVE MG/DL
HBA1C MFR BLD: 5.4 % (ref 4.8–5.6)
HCT VFR BLD AUTO: 39.8 % (ref 34–46.6)
HDLC SERPL-MCNC: 62 MG/DL (ref 40–60)
HGB BLD-MCNC: 13.3 G/DL (ref 12–15.9)
KETONES UR QL: NEGATIVE
LDLC SERPL CALC-MCNC: 108 MG/DL (ref 0–100)
LDLC/HDLC SERPL: 1.74 {RATIO}
LEUKOCYTE EST, POC: ABNORMAL
Lab: ABNORMAL
MCH RBC QN AUTO: 30.7 PG (ref 26.6–33)
MCHC RBC AUTO-ENTMCNC: 33.4 G/DL (ref 31.5–35.7)
MCV RBC AUTO: 91.9 FL (ref 79–97)
NITRITE UR-MCNC: NEGATIVE MG/ML
PH UR: 6 [PH] (ref 5–8)
PLATELET # BLD AUTO: 245 10*3/MM3 (ref 140–450)
PMV BLD AUTO: 10.4 FL (ref 6–12)
POTASSIUM SERPL-SCNC: 4.2 MMOL/L (ref 3.5–5.2)
PROT SERPL-MCNC: 7 G/DL (ref 6–8.5)
PROT UR STRIP-MCNC: NEGATIVE MG/DL
RBC # BLD AUTO: 4.33 10*6/MM3 (ref 3.77–5.28)
RBC # UR STRIP: ABNORMAL /UL
SODIUM SERPL-SCNC: 137 MMOL/L (ref 136–145)
SP GR UR: 1.01 (ref 1–1.03)
TESTOST SERPL-MCNC: 12 NG/DL (ref 2.9–40.8)
TRIGL SERPL-MCNC: 51 MG/DL (ref 0–150)
TSH SERPL DL<=0.05 MIU/L-ACNC: 1.66 UIU/ML (ref 0.27–4.2)
UROBILINOGEN UR QL: NORMAL
VIT B12 BLD-MCNC: 693 PG/ML (ref 211–946)
VLDLC SERPL-MCNC: 10 MG/DL (ref 5–40)
WBC NRBC COR # BLD AUTO: 6.36 10*3/MM3 (ref 3.4–10.8)

## 2025-04-21 PROCEDURE — 84443 ASSAY THYROID STIM HORMONE: CPT

## 2025-04-21 PROCEDURE — 81003 URINALYSIS AUTO W/O SCOPE: CPT | Performed by: INTERNAL MEDICINE

## 2025-04-21 PROCEDURE — 85027 COMPLETE CBC AUTOMATED: CPT

## 2025-04-21 PROCEDURE — 83036 HEMOGLOBIN GLYCOSYLATED A1C: CPT

## 2025-04-21 PROCEDURE — 82607 VITAMIN B-12: CPT

## 2025-04-21 PROCEDURE — 80053 COMPREHEN METABOLIC PANEL: CPT

## 2025-04-21 PROCEDURE — 99396 PREV VISIT EST AGE 40-64: CPT | Performed by: INTERNAL MEDICINE

## 2025-04-21 PROCEDURE — 82306 VITAMIN D 25 HYDROXY: CPT

## 2025-04-21 PROCEDURE — 84403 ASSAY OF TOTAL TESTOSTERONE: CPT

## 2025-04-21 PROCEDURE — 87086 URINE CULTURE/COLONY COUNT: CPT | Performed by: INTERNAL MEDICINE

## 2025-04-21 PROCEDURE — 80061 LIPID PANEL: CPT

## 2025-04-21 PROCEDURE — 87147 CULTURE TYPE IMMUNOLOGIC: CPT | Performed by: INTERNAL MEDICINE

## 2025-04-21 NOTE — PROGRESS NOTES
Chief Complaint   Patient presents with    Annual Exam     With fasting labs    Elevated Blood Pressure     Past couple months, with ha's the past couple weeks.    HRT       History of Present Illness  HM, Adult Female: The patient is being seen for a health maintenance evaluation. The last health maintenance visit was 1 year(s) ago. and gynecology - Dr. Campos.  Social History: She is  with 1 child/son Gopi- 12 yo. 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  She is a prior smoker. She reports socially drinking alcohol. Denies any illicit drug use.  General Health: The patient's health is described as good. She has regular dental visits, s/p recent root canal . She denies vision problems. She denies hearing loss. Immunizations status: up to date.   Lifestyle:. She consumes a diverse and healthy diet. She does not have any weight concerns. She exercises regularly. She denies tobacco. She denies alcohol use. She denies drug use.   Reproductive health:. She reports irregular periods, had one after almost a yeart Perimenopausal.  Screening: Cancer screening reviewed and current.   Metabolic screening reviewed and current.   Risk screening reviewed and current. .     HPI  BP has been a little higher the last few months,  Has had the blahs, would like to revisit HRT. Also gained some weight.   Increased fatigue, asking if she can get a testosterone level.    Review of Systems   Constitutional:  Positive for fatigue. Negative for chills.   HENT:  Negative for congestion, ear pain and sore throat.    Eyes:  Negative for pain, redness and visual disturbance.   Respiratory:  Negative for cough and shortness of breath.    Cardiovascular:  Negative for chest pain, palpitations and leg swelling.   Gastrointestinal:  Negative for abdominal pain, diarrhea and nausea.   Endocrine: Negative for cold intolerance and heat intolerance.   Genitourinary:  Negative for flank pain and urgency.   Musculoskeletal:   Negative for arthralgias and gait problem.   Skin:  Negative for pallor and rash.   Neurological:  Negative for dizziness, weakness and headaches.   Psychiatric/Behavioral:  Negative for dysphoric mood and sleep disturbance. The patient is not nervous/anxious.        Patient Active Problem List   Diagnosis    Left flank pain    Positive PATRICK (antinuclear antibody)    Post herpetic neuralgia    Retinal migraine    Cervical polyp    Abnormal uterine bleeding (AUB)    Women's annual routine gynecological examination    Rotator cuff tendonitis    Acute bronchitis    Atopic rhinitis    Reactive airway disease    Upper respiratory tract infection    Menopausal symptoms       Social History     Socioeconomic History    Marital status:    Tobacco Use    Smoking status: Former     Current packs/day: 0.00     Average packs/day: 0.3 packs/day for 10.0 years (2.5 ttl pk-yrs)     Types: Cigarettes     Start date: 1989     Quit date: 1999     Years since quittin.3    Smokeless tobacco: Never   Vaping Use    Vaping status: Never Used   Substance and Sexual Activity    Alcohol use: No     Comment: socially    Drug use: No    Sexual activity: Yes     Partners: Female     Birth control/protection: None       Current Outpatient Medications on File Prior to Visit   Medication Sig Dispense Refill    Multiple Vitamins-Minerals (MULTIVITAMIN ADULT) tablet Take 1 tablet by mouth Daily.      propranolol (INDERAL) 10 MG tablet Take 1 tablet by mouth 3 (Three) Times a Day As Needed (anxiety). take 1 hr before flying 30 tablet 0    [DISCONTINUED] Ashwagandha 500 MG capsule Take 500 mg by mouth 2 (Two) Times a Day.      [DISCONTINUED] estradiol-norethindrone (COMBIPATCH) 0.05-0.14 MG/DAY patch Place 1 patch on the skin as directed by provider 2 (Two) Times a Week. 8 each 11    [DISCONTINUED] FIBER ADULT GUMMIES PO Take  by mouth.      [DISCONTINUED] traMADol (ULTRAM) 50 MG tablet Take 1 tablet by mouth Every 8 (Eight) Hours  "As Needed. 5 tablet 0    [DISCONTINUED] traZODone (DESYREL) 50 MG tablet 1-2 tabs PO at HS for insomnia 60 tablet 5    [DISCONTINUED] VITAMIN D, CHOLECALCIFEROL, PO Take  by mouth.       No current facility-administered medications on file prior to visit.       Allergies   Allergen Reactions    Tramadol Mental Status Change       /72   Pulse 58   Temp 97.7 °F (36.5 °C)   Ht 170.2 cm (67\")   Wt 79.7 kg (175 lb 9.6 oz)   LMP 11/30/2021   SpO2 98% Comment: ra  BMI 27.50 kg/m²            The following portions of the patient's history were reviewed and updated as appropriate: allergies, current medications, past family history, past medical history, past social history, past surgical history, and problem list.    Physical Exam  Constitutional:       General: She is not in acute distress.     Appearance: Normal appearance.   HENT:      Head: Normocephalic and atraumatic.      Right Ear: Tympanic membrane and external ear normal.      Left Ear: Tympanic membrane and external ear normal.      Nose: Nose normal.      Mouth/Throat:      Mouth: Mucous membranes are moist.   Eyes:      General: No scleral icterus.  Neck:      Vascular: No carotid bruit.   Cardiovascular:      Rate and Rhythm: Normal rate and regular rhythm.      Pulses: Normal pulses.      Heart sounds: Normal heart sounds. No murmur heard.     No friction rub. No gallop.   Pulmonary:      Effort: Pulmonary effort is normal.      Breath sounds: Normal breath sounds. No rhonchi or rales.   Abdominal:      General: Bowel sounds are normal. There is no distension.      Palpations: Abdomen is soft.      Tenderness: There is no right CVA tenderness, left CVA tenderness, guarding or rebound.      Hernia: No hernia is present.   Musculoskeletal:         General: No tenderness. Normal range of motion.      Cervical back: Normal range of motion.      Right lower leg: No edema.      Left lower leg: No edema.   Lymphadenopathy:      Cervical: No cervical " adenopathy.   Skin:     General: Skin is warm.      Findings: No rash.   Neurological:      General: No focal deficit present.      Mental Status: She is alert and oriented to person, place, and time. Mental status is at baseline.      Cranial Nerves: No cranial nerve deficit.      Sensory: No sensory deficit.      Coordination: Coordination normal.      Gait: Gait normal.      Deep Tendon Reflexes: Reflexes normal.      Comments: Balance wnl   Psychiatric:         Mood and Affect: Mood normal.         Behavior: Behavior normal.           BMI is >= 25 and <30. (Overweight) The following options were offered after discussion;: exercise counseling/recommendations and nutrition counseling/recommendations       Results for orders placed or performed in visit on 04/21/25   POCT urinalysis dipstick, automated    Collection Time: 04/21/25  9:04 AM    Specimen: Urine   Result Value Ref Range    Color Yellow Yellow, Straw, Dark Yellow, Dana    Clarity, UA Clear Clear    Specific Gravity  1.015 1.005 - 1.030    pH, Urine 6.0 5.0 - 8.0    Leukocytes Small (1+) (A) Negative    Nitrite, UA Negative Negative    Protein, POC Negative Negative mg/dL    Glucose, UA Negative Negative mg/dL    Ketones, UA Negative Negative    Urobilinogen, UA Normal Normal, 0.2 E.U./dL    Bilirubin Negative Negative    Blood, UA 1+ (A) Negative    Lot Number [id number]     Expiration Date 6/4/26        Diagnoses and all orders for this visit:    1. Routine general medical examination at a health care facility (Primary)  -     POCT urinalysis dipstick, automated  -     CBC (No Diff); Future  -     Comprehensive Metabolic Panel; Future  -     Hemoglobin A1c; Future  -     Lipid Panel; Future  -     TSH Rfx On Abnormal To Free T4; Future  -     Vitamin B12; Future    2. Urinary tract infection with hematuria, site unspecified  -     Urine Culture - Urine, Urine, Clean Catch; Future  -     Urine Culture - Urine, Urine, Clean Catch    3. Screening  for colon cancer  -     Ambulatory Referral For Screening Colonoscopy    4. Menopausal symptoms  -     estradiol-norethindrone (COMBIPATCH) 0.05-0.14 MG/DAY patch; Place 1 patch on the skin as directed by provider 2 (Two) Times a Week.  Dispense: 24 each; Refill: 3  -     Testosterone; Future    5. Vitamin D deficiency  -     Vitamin D,25-Hydroxy; Future      Repeat blood pressure is significantly better in the 120 systolic.  Advised to monitor her salt intake and increase her exercise as she reported that she has not been exercising recently.    Health Maintenance   Topic Date Due    COLORECTAL CANCER SCREENING  09/30/2021    Pneumococcal Vaccine 50+ (1 of 1 - PCV) 10/18/2025 (Originally 10/1/2017)    COVID-19 Vaccine (5 - 2024-25 season) 10/21/2025 (Originally 9/1/2024)    INFLUENZA VACCINE  07/01/2025    ANNUAL PHYSICAL  04/21/2026    PAP SMEAR  06/15/2026    MAMMOGRAM  11/15/2026    TDAP/TD VACCINES (3 - Td or Tdap) 11/13/2028    HEPATITIS C SCREENING  Completed    ZOSTER VACCINE  Completed       Discussion/Summary  Impression: health maintenance visit. Currently, she eats an adequate diet and has an adequate exercise regimen.   Cervical cancer screening:Pap smear is current.   Breast cancer screening: mammogram is current.   Colorectal cancer screening: colonoscopy is current.  Osteoporosis screening: Bone mineral density test is due at 60.   CT low dose screen -not indicated.  Screening lab work includes hemoglobin, glucose, lipid profile, thyroid function testing, 25-hydroxyvitamin D and urinalysis.   Immunizations are needed, immunizations will be given as outlined in the orders   Advice and education were given regarding cardiovascular risk reduction, healthy dietary habits, Seatbelt and helmet use and self skin examination.     Return in about 1 year (around 4/21/2026) for Annual.      Electronically signed by:    Joana Hunter MD

## 2025-04-23 LAB — BACTERIA SPEC AEROBE CULT: ABNORMAL
